# Patient Record
Sex: MALE | Race: WHITE | NOT HISPANIC OR LATINO | Employment: OTHER | URBAN - METROPOLITAN AREA
[De-identification: names, ages, dates, MRNs, and addresses within clinical notes are randomized per-mention and may not be internally consistent; named-entity substitution may affect disease eponyms.]

---

## 2017-01-07 ENCOUNTER — OFFICE VISIT (OUTPATIENT)
Dept: URGENT CARE | Facility: URGENT CARE | Age: 67
End: 2017-01-07
Payer: COMMERCIAL

## 2017-01-07 VITALS
WEIGHT: 235 LBS | OXYGEN SATURATION: 96 % | DIASTOLIC BLOOD PRESSURE: 90 MMHG | BODY MASS INDEX: 31.86 KG/M2 | TEMPERATURE: 98.5 F | HEART RATE: 64 BPM | SYSTOLIC BLOOD PRESSURE: 154 MMHG

## 2017-01-07 DIAGNOSIS — I10 ESSENTIAL HYPERTENSION, BENIGN: ICD-10-CM

## 2017-01-07 DIAGNOSIS — H66.005 RECURRENT ACUTE SUPPURATIVE OTITIS MEDIA WITHOUT SPONTANEOUS RUPTURE OF LEFT TYMPANIC MEMBRANE: Primary | ICD-10-CM

## 2017-01-07 PROCEDURE — 99214 OFFICE O/P EST MOD 30 MIN: CPT | Performed by: FAMILY MEDICINE

## 2017-01-07 NOTE — PROGRESS NOTES
SUBJECTIVE:                                                    Vikash Taylor is a 66 year old male who presents to clinic today for the following health issues:      RESPIRATORY SYMPTOMS      Duration: 3 days    Description  Left ear pain     Severity: moderate    Accompanying signs and symptoms: None    History (predisposing factors):  none    Precipitating or alleviating factors: None    Therapies tried and outcome:  none     Patient had a cold about 2 weeks ago lasted for 10 days then got better  Thought he was doing really well  3 days ago started having some left maxillary sinus pain and pressure  Then moving to his ear now having severe pain in the ear   Pain is intermittent and moderate.  Doesn't bother him chewing or moving his jaw.   No fevers or chills chest pain or shortness of breath   Because of persistent and worsening symptoms came in to be seen    Problem list and histories reviewed & adjusted, as indicated.  Additional history: as documented    Problem list, Medication list, Allergies, and Medical/Social/Surgical histories reviewed in EPIC and updated as appropriate.    ROS:  Constitutional, HEENT, cardiovascular, pulmonary, gi and gu systems are negative, except as otherwise noted.  No fevers or chills chest pain or shortness of breath  No headache no blurring of vision no numnness tingling or dizziness.  OBJECTIVE:                                                    /90 mmHg  Pulse 64  Temp(Src) 98.5  F (36.9  C) (Tympanic)  Wt 235 lb (106.595 kg)  SpO2 96%  Body mass index is 31.86 kg/(m^2).  GENERAL: healthy, alert and no distress  EYES: pink palpebral conjunctiva, anicteric sclera, pupils equally reactive to light and accomodation, extraocular muscles intact full and equal.  ENT: midline nasal septum, positive  nasal congestion   Left ear:no tragal tenderness, no mastoid tenderness dull, distorted and whitish tympaninc membrane   Right ear: no tragal tenderness, no mastoid  tenderness normal tympaninc membrane   NECK: no adenopathy, no asymmetry or  masses  RESP: lungs clear to auscultation - no rales, rhonchi or wheezes  CV: regular rate and rhythm, normal S1 S2, no S3 or S4, no murmur, click or rub, no peripheral edema and peripheral pulses strong  ABDOMEN: soft, nontender, no hepatosplenomegaly, no masses and bowel sounds normal  MS: no gross musculoskeletal defects noted, no edema  NEURO: Normal strength and tone, mentation intact and speech normal    Diagnostic Test Results:  No results found for this or any previous visit (from the past 24 hour(s)).     ASSESSMENT/PLAN:                                                      No diagnosis found.     ICD-10-CM    1. Recurrent acute suppurative otitis media without spontaneous rupture of left tympanic membrane H66.005 amoxicillin-clavulanate (AUGMENTIN) 875-125 MG per tablet     OTOLARYNGOLOGY REFERRAL   2. Essential hypertension, benign I10      Prescribed with augmentin  Concern about recurrent ear infections referred to ENT  BP not well controlled.   Recommend recheck with pharmacy  Your blood pressure reading was elevated today.  Recommend that you have it re-checked either at home or at our clinic within a week  Avoid cold medicines that have pseudoephedrin in it, or Sudafed. You may take regular or plain Robitussin or Mucinex  If you are unsure, please ask the pharmacist    If your blood pressure top number (systolic) 180 and above, or the bottom number (diastolic) 120 and above, you need to be seen immediately.  If persistently elevated top number 140 and above, or the bottom number 90 and above, please schedule an appointment to see a provider in clinic within a week.  If you have very elevated blood pressures, accompanied by headache, chest pain, numbness, weakness, slurring of speech, confusion, difficulty walking, call 911 and go to the ER      Recommend follow up with primary care provider if no relief in 3 days, sooner if  worse  Needs ear recheck with primary care provider in 2-4 weeks  Adverse reactions of medications discussed.  Over the counter medications discussed.   Aware to come back in if with worsening symptoms or if no relief despite treatment plan  Patient voiced understanding and had no further questions.     MD Kerry Thompson MD  [facility]

## 2017-01-07 NOTE — MR AVS SNAPSHOT
After Visit Summary   1/7/2017    Vikash Taylor    MRN: [id number]           Patient Information     Date Of Birth          1950        Visit Information        Provider Department      1/7/2017 9:40 AM Kerry Newell MD [facility]        Today's Diagnoses     Recurrent acute suppurative otitis media without spontaneous rupture of left tympanic membrane    -  1        Follow-ups after your visit        Additional Services     OTOLARYNGOLOGY REFERRAL       Your provider has referred you to: [facility]: [facility] [phone]   [email or web address]    Please be aware that coverage of these services is subject to the terms and limitations of your health insurance plan.  Call member services at your health plan with any benefit or coverage questions.      Please bring the following with you to your appointment:    (1) Any X-Rays, CTs or MRIs which have been performed.  Contact the facility where they were done to arrange for  prior to your scheduled appointment.   (2) List of current medications  (3) This referral request   (4) Any documents/labs given to you for this referral                  Who to contact     If you have questions or need follow up information about today's clinic visit or your schedule please contact [facility] directly at [phone].  Normal or non-critical lab and imaging results will be communicated to you by MyChart, letter or phone within 4 business days after the clinic has received the results. If you do not hear from us within 7 days, please contact the clinic through MyChart or phone. If you have a critical or abnormal lab result, we will notify you by phone as soon as possible.  Submit refill requests through [employer] or call your pharmacy and they will forward the refill request to us. Please allow 3 business days for your refill to be completed.          Additional Information About  Your Visit        restOpolishart Information     [employer] gives you secure access to your electronic health record. If you see a primary care provider, you can also send messages to your care team and make appointments. If you have questions, please call your primary care clinic.  If you do not have a primary care provider, please call [phone] and they will assist you.        Care EveryWhere ID     This is your Care EveryWhere ID. This could be used by other organizations to access your [place] medical records  [id number]        Your Vitals Were     Pulse Temperature Pulse Oximetry             64 98.5  F (36.9  C) (Tympanic) 96%          Blood Pressure from Last 3 Encounters:   01/07/17 154/90   12/01/16 152/90   11/19/16 160/80    Weight from Last 3 Encounters:   01/07/17 235 lb (106.595 kg)   12/01/16 245 lb (111.131 kg)   11/19/16 244 lb (110.678 kg)              We Performed the Following     OTOLARYNGOLOGY REFERRAL          Today's Medication Changes          These changes are accurate as of: 1/7/17 10:47 AM.  If you have any questions, ask your nurse or doctor.               Start taking these medicines.        Dose/Directions    amoxicillin-clavulanate 875-125 MG per tablet   Commonly known as:  AUGMENTIN   Used for:  Recurrent acute suppurative otitis media without spontaneous rupture of left tympanic membrane   Started by:  Kerry Newell MD        Dose:  1 tablet   Take 1 tablet by mouth 2 times daily for 10 days   Quantity:  20 tablet   Refills:  0            Where to get your medicines      These medications were sent to [facility] [address] [facility]  [address] [address], [facility] [address]     Phone:  [phone]    - amoxicillin-clavulanate 875-125 MG per tablet             Primary Care Provider Office Phone # Fax #    Joss Neumann PA-C [phone]       [facility] [address] [facility] [address]         Thank you!     Thank you for choosing [facility]  for your care. Our goal is always to provide you with excellent care. Hearing back from our patients is one way we can continue to improve our services. Please take a few minutes to complete the written survey that you may receive in the mail after your visit with us. Thank you!             Your Updated Medication List - Protect others around you: Learn how to safely use, store and throw away your medicines at www.disposemymeds.org.          This list is accurate as of: 1/7/17 10:47 AM.  Always use your most recent med list.                   Brand Name Dispense Instructions for use    amLODIPine 5 MG tablet    NORVASC    30 tablet    Take 0.5 tablets (2.5 mg) by mouth daily       amoxicillin-clavulanate 875-125 MG per tablet    AUGMENTIN    20 tablet    Take 1 tablet by mouth 2 times daily for 10 days       neomycin-polymyxin-hydrocortisone 3.5-15716-8 otic suspension    CORTISPORIN    10 mL    Place 4 drops in ear(s) 4 times daily For 10 days.

## 2017-01-11 ENCOUNTER — OFFICE VISIT (OUTPATIENT)
Dept: OTOLARYNGOLOGY | Facility: CLINIC | Age: 67
End: 2017-01-11
Payer: COMMERCIAL

## 2017-01-11 ENCOUNTER — OFFICE VISIT (OUTPATIENT)
Dept: AUDIOLOGY | Facility: CLINIC | Age: 67
End: 2017-01-11
Payer: COMMERCIAL

## 2017-01-11 VITALS — RESPIRATION RATE: 18 BRPM | WEIGHT: 235 LBS | BODY MASS INDEX: 31.83 KG/M2 | HEIGHT: 72 IN

## 2017-01-11 DIAGNOSIS — H90.3 SENSORINEURAL HEARING LOSS, BILATERAL: Primary | ICD-10-CM

## 2017-01-11 DIAGNOSIS — H93.8X2 SENSATION OF PLUGGED EAR ON LEFT SIDE: Primary | ICD-10-CM

## 2017-01-11 DIAGNOSIS — H93.13 BILATERAL TINNITUS: ICD-10-CM

## 2017-01-11 DIAGNOSIS — H92.02 OTALGIA OF LEFT EAR: ICD-10-CM

## 2017-01-11 DIAGNOSIS — G44.009 CLUSTER HEADACHE, NOT INTRACTABLE, UNSPECIFIED CHRONICITY PATTERN: ICD-10-CM

## 2017-01-11 DIAGNOSIS — J31.0 CHRONIC RHINITIS: ICD-10-CM

## 2017-01-11 DIAGNOSIS — M62.838 SPASM OF MUSCLE: ICD-10-CM

## 2017-01-11 DIAGNOSIS — H61.22 IMPACTED CERUMEN OF LEFT EAR: ICD-10-CM

## 2017-01-11 PROCEDURE — 69210 REMOVE IMPACTED EAR WAX UNI: CPT | Performed by: OTOLARYNGOLOGY

## 2017-01-11 PROCEDURE — 92557 COMPREHENSIVE HEARING TEST: CPT | Performed by: AUDIOLOGIST

## 2017-01-11 PROCEDURE — 99203 OFFICE O/P NEW LOW 30 MIN: CPT | Mod: 25 | Performed by: OTOLARYNGOLOGY

## 2017-01-11 PROCEDURE — 99207 ZZC NO CHARGE LOS: CPT | Performed by: AUDIOLOGIST

## 2017-01-11 PROCEDURE — 92567 TYMPANOMETRY: CPT | Performed by: AUDIOLOGIST

## 2017-01-11 RX ORDER — CYCLOBENZAPRINE HCL 10 MG
5-10 TABLET ORAL 3 TIMES DAILY PRN
Qty: 30 TABLET | Refills: 1 | Status: SHIPPED | OUTPATIENT
Start: 2017-01-11 | End: 2017-02-07

## 2017-01-11 RX ORDER — FLUTICASONE PROPIONATE 50 MCG
2 SPRAY, SUSPENSION (ML) NASAL DAILY
Qty: 16 G | Refills: 3 | Status: SHIPPED | OUTPATIENT
Start: 2017-01-11 | End: 2017-11-07

## 2017-01-11 RX ORDER — FEXOFENADINE HCL 180 MG/1
180 TABLET ORAL DAILY
Qty: 90 TABLET | Refills: 3 | Status: SHIPPED | OUTPATIENT
Start: 2017-01-11 | End: 2017-11-07

## 2017-01-11 ASSESSMENT — PAIN SCALES - GENERAL: PAINLEVEL: MILD PAIN (2)

## 2017-01-11 NOTE — PATIENT INSTRUCTIONS
General Scheduling Information  To schedule your CT/MRI scan, please contact [facility] at [phone]   [address] [place] [address]    To schedule your Surgery, please contact our Specialty Schedulers at [phone]    ENT Clinic Locations Clinic Hours Telephone Number     [facility]  [address] [place] [address]   Tuesday:       8:00am -- 4:00pm    Wednesday:  8:00am - 4:00pm   To schedule an appointment with   Dr. Leong,   please contact our   Specialty Scheduling Department at:     [phone]       [facility]  [address] [place] [address]   Friday:          8:00am - 4:00pm         Urgent Care Locations Clinic Hours Telephone Numbers     [facility] Eugene  [address] [place] [address]     Monday-Friday:     11:00pm - 9:00pm    Saturday-Sunday:  9:00am - 5:00pm   [phone]     [facility]ays  [address] [place] [address]     Monday-Friday:      5:00pm - 9:00pm     Saturday-Sunday:  9:00am - 5:00pm   [phone]

## 2017-01-11 NOTE — PROGRESS NOTES
Chief Complaint - sinusitis, left ear pain    History of Present Illness - Vikash Taylor is a 66 year old male who presents to me today with left ear pain.  It has been present and noticeable for approximately 5-7 days.  This was preceded by a left cheek issue. There is a history of some ear pain with sinusitis, and has a lot of sinusitis. Gets pain in back of left ear and radiates to left scalp. Pain was up to 8-9/10. Now with antibiotics, pain is improved, but still 7-8/10. Left ear hearing is diminished, feels plugged. Right ear doesn't have pain, but it feels plugged. Has tried popping ears, hurts some. No otorrhea.     Gets sinusitis 2-3 times per year. Gets cheek pain. Starts as a headache. Gets drainage. Has some allergies to animal fur. Doesn't do much with nose or sinuses.      Past Medical History -   Patient Active Problem List   Diagnosis     Hypogonadism male     Hypertriglyceridemia     Dyslipidemia     HYPERLIPIDEMIA LDL GOAL <160     Advanced directives, counseling/discussion     Shingles     Obesity     Wears hearing aid     Bilateral low back pain with left-sided sciatica     Essential hypertension, benign     Heart murmur     SOB (shortness of breath)     Bilateral edema of lower extremity       Current Medications -   Current outpatient prescriptions:      amoxicillin-clavulanate (AUGMENTIN) 875-125 MG per tablet, Take 1 tablet by mouth 2 times daily for 10 days, Disp: 20 tablet, Rfl: 0     amLODIPine (NORVASC) 5 MG tablet, Take 0.5 tablets (2.5 mg) by mouth daily, Disp: 30 tablet, Rfl: 1     neomycin-polymyxin-hydrocortisone (CORTISPORIN) 3.5-39632-2 otic suspension, Place 4 drops in ear(s) 4 times daily For 10 days., Disp: 10 mL, Rfl: 1    Allergies -   Allergies   Allergen Reactions     Nkda [No Known Drug Allergies]        Social History -   Social History     Social History     Marital Status:      Spouse Name: N/A     Number of Children: N/A     Years of Education: N/A     Social  History Main Topics     Smoking status: Former Smoker     Smokeless tobacco: Former User     Types: Chew     Quit date: 01/01/2015     Alcohol Use: 0.0 oz/week     0 Standard drinks or equivalent per week      Comment: occ     Drug Use: No     Sexual Activity:     Partners: Female     Other Topics Concern     None     Social History Narrative       Family History -   Family History   Problem Relation Age of Onset     DIABETES Mother      HEART DISEASE Father      chf     Other Cancer Brother        Review of Systems - As per HPI and PMHx, otherwise 7 system review of the head and neck negative.    Physical Exam  Resp 18  Ht 1.829 m (6')  Wt 106.595 kg (235 lb)  BMI 31.86 kg/m2  General - The patient is nontoxic, in no distress.  Alert and oriented to person and place, answers questions and cooperates with examination appropriately.   Voice and Breathing - The patient was breathing comfortably without the use of accessory muscles. There was no wheezing, stridor, or stertor.  The patients voice was clear and strong.  Ears - had left cerumen impaction and I used the otomicroscope with him supine to suction this out. Had a large skin debri in the medial canal blocking all view of the tympanic membrane. I suctioned this and removed it. The tympanic membrane on the L is intact, no middle ear effusion. No acute infection.  No fluid or purulence was seen in the external canal. The tympanic membrane on the right is intact, no middle ear effusion. No acute infection.  No fluid or purulence was seen in the external canal.   Nose - Septum to the right. Turbinates normal size. Airway patent bilaterally. No polyps, masses, or pus.   Eyes - Extraocular movements intact. Sclera were not icteric or injected.  Mouth - Examination of the oral cavity showed pink, healthy mucosa. No lesions or ulcerations noted.  The tongue was mobile and midline.  Throat - The walls of the oropharynx were smooth, symmetric, and had no lesions or  ulcerations.  The uvula was midline on elevation.    Neck - Palpation of the occipital, submental, submandibular, internal jugular chain, and supraclavicular nodes did not demonstrate any abnormal lymph nodes or masses. No parotid masses. Palpation of the thyroid was soft and smooth, with no nodules or goiter appreciated.  The trachea was mobile and midline.  Neurological - Cranial nerves 2 through 12 were grossly intact. House-Brackmann grade 1 out of 6 bilaterally.    Audiologic Studies - An audiogram and tympanogram were performed today as part of the evaluation and personally reviewed. The tympanogram shows a type A, low volume on both sides.  The audiogram showed downsloping sensorineural hearing loss. Mostly symmetric.       Assessment and Plan - Vikash Taylor is a 66 year old male who presents to me today with  Primarily left headache and pain, radiates into left temporal area. Sometimes left cheek pain. Ear exam is normal. Differential includes headache (cluster or trigeminal neuralgia), TMJ, sinusitis. I will have him eat soft diet, hot packs, NSAIDS, and flexeril for possible TMJ. If fails he needs to see neurology.     Can use flonase, nasal saline irrigations, and antihistamines daily for sinusitis prevention.     Hearing aids for sensorineural hearing loss.    Valentín Leong MD  Otolaryngology  [facility]

## 2017-01-11 NOTE — MR AVS SNAPSHOT
After Visit Summary   1/11/2017    Vikash Taylor    MRN: [id number]           Patient Information     Date Of Birth          1950        Visit Information        Provider Department      1/11/2017 11:15 AM Valentín Leong MD [facility]        Today's Diagnoses     Sensation of plugged ear on left side    -  1     Otalgia of left ear         Cluster headache, not intractable, unspecified chronicity pattern           Care Instructions    General Scheduling Information  To schedule your CT/MRI scan, please contact [facility] at [phone] [address] [place] [address]    To schedule your Surgery, please contact our Specialty Schedulers at [phone]    ENT Clinic Locations Clinic Hours Telephone Number     [facility] Negron  [address] [place] [address]   Tuesday:       8:00am -- 4:00pm    Wednesday:  8:00am - 4:00pm   To schedule an appointment with   Dr. Leong,   please contact our   Specialty Scheduling Department at:     [phone]       [place]  [address]   [place] [address]   Friday:          8:00am - 4:00pm         Urgent Care Locations Clinic Hours Telephone Numbers     [place] [facility]  [address]  [place] [address]     Monday-Friday:     11:00pm - 9:00pm    Saturday-Sunday:  9:00am - 5:00pm   [phone]     [place] [facility]  [address]   [place] [address]     Monday-Friday:      5:00pm - 9:00pm     Saturday-Sunday:  9:00am - 5:00pm   [phone]             Follow-ups after your visit        Additional Services     AUDIOLOGY ADULT REFERRAL       Your provider has referred you to: [facility]: [facility] [place] [phone]   [email or web address]    Treatment:  Evaluation & Treatment  Specialty Testing:  Audiogram w/Tymps    Please be aware that coverage of these services is subject to the terms and limitations of your health insurance plan.  Call member  services at your health plan with any benefit or coverage questions.      Please bring the following to your appointment:  >>   Any x-rays, CTs or MRIs which have been performed.  Contact the facility where they were done to arrange for  prior to your scheduled appointment.   >>   List of current medications  >>   This referral request   >>   Any documents/labs given to you for this referral            NEUROLOGY ADULT REFERRAL       Your provider has referred you to: [facility] [place] [facility] [phone]   [email or web address]    Reason for Referral: Consult    Please be aware that coverage of these services is subject to the terms and limitations of your health insurance plan.  Call member services at your health plan with any benefit or coverage questions.      Please bring the following with you to your appointment:    (1) Any X-Rays, CTs or MRIs which have been performed.  Contact the facility where they were done to arrange for  prior to your scheduled appointment.    (2) List of current medications  (3) This referral request   (4) Any documents/labs given to you for this referral                  Your next 10 appointments already scheduled     Jan 11, 2017  2:30 PM   [facility]    [address] [place] [address] [phone]              Who to contact     If you have questions or need follow up information about today's clinic visit or your schedule please contact [facility] directly at [phone].  Normal or non-critical lab and imaging results will be communicated to you by MyChart, letter or phone within 4 business days after the clinic has received the results. If you do not hear from us within 7 days, please contact the clinic through MyChart or phone. If you have a critical or abnormal lab result, we will notify you by phone as soon as  possible.  Submit refill requests through [employer] or call your pharmacy and they will forward the refill request to us. Please allow 3 business days for your refill to be completed.          Additional Information About Your Visit        [employer] Information     [employer] gives you secure access to your electronic health record. If you see a primary care provider, you can also send messages to your care team and make appointments. If you have questions, please call your primary care clinic.  If you do not have a primary care provider, please call [phone] and they will assist you.        Care EveryWhere ID     This is your Care EveryWhere ID. This could be used by other organizations to access your [place] medical records  [id number]        Your Vitals Were     Respirations Height BMI (Body Mass Index)             18 1.829 m (6') 31.86 kg/m2          Blood Pressure from Last 3 Encounters:   01/07/17 154/90   12/01/16 152/90   11/19/16 160/80    Weight from Last 3 Encounters:   01/11/17 106.595 kg (235 lb)   01/07/17 106.595 kg (235 lb)   12/01/16 111.131 kg (245 lb)              We Performed the Following     AUDIOLOGY ADULT REFERRAL     NEUROLOGY ADULT REFERRAL        Primary Care Provider Office Phone # Fax #    Joss Neumann PA-C [phone]       [facility] [address] [facility] [address]        Thank you!     Thank you for choosing [facility] [place]  for your care. Our goal is always to provide you with excellent care. Hearing back from our patients is one way we can continue to improve our services. Please take a few minutes to complete the written survey that you may receive in the mail after your visit with us. Thank you!             Your Updated Medication List - Protect others around you: Learn how to safely use, store and throw away your medicines at www.disposemymeds.org.          This list is accurate as of: 1/11/17 12:13 PM.  Always use your most recent  med list.                   Brand Name Dispense Instructions for use    amLODIPine 5 MG tablet    NORVASC    30 tablet    Take 0.5 tablets (2.5 mg) by mouth daily       amoxicillin-clavulanate 875-125 MG per tablet    AUGMENTIN    20 tablet    Take 1 tablet by mouth 2 times daily for 10 days       neomycin-polymyxin-hydrocortisone 3.5-12008-4 otic suspension    CORTISPORIN    10 mL    Place 4 drops in ear(s) 4 times daily For 10 days.

## 2017-01-11 NOTE — NURSING NOTE
Chief Complaint   Patient presents with     Otalgia     Left ear x 1 week. Has had sinus issues for a long time, thinks it causes his ear pain       Initial Resp 18  Ht 1.829 m (6')  Wt 106.595 kg (235 lb)  BMI 31.86 kg/m2 Estimated body mass index is 31.86 kg/(m^2) as calculated from the following:    Height as of this encounter: 1.829 m (6').    Weight as of this encounter: 106.595 kg (235 lb).  BP completed using cuff size: NA (Not Taken)    Alejandrina Ahn MA

## 2017-01-26 ENCOUNTER — TELEPHONE (OUTPATIENT)
Dept: FAMILY MEDICINE | Facility: CLINIC | Age: 67
End: 2017-01-26

## 2017-01-26 NOTE — TELEPHONE ENCOUNTER
Panel Management Review      Patient has the following on his problem list:     Hypertension   Last three blood pressure readings:  BP Readings from Last 3 Encounters:   01/07/17 154/90   12/01/16 152/90   11/19/16 160/80     Blood pressure: Failed    HTN Guidelines:  Age 18-59 BP range:  Less than 140/90  Age 60-85 with Diabetes:  Less than 140/90  Age 60-85 without Diabetes:  less than 150/90      Composite cancer screening  Chart review shows that this patient is due/due soon for the following None  Summary:    Patient is due/failing the following:   BP CHECK    Action needed:   Patient needs office visit for blood pressure check per last visit with Dr. Newell .    Type of outreach:    Sent [employer] message.    Questions for provider review:    None                                                                                                                                    Ghanshyam Kat CMA       Chart routed to closed .

## 2017-02-03 ENCOUNTER — TELEPHONE (OUTPATIENT)
Dept: FAMILY MEDICINE | Facility: CLINIC | Age: 67
End: 2017-02-03

## 2017-02-03 DIAGNOSIS — I10 ESSENTIAL HYPERTENSION, BENIGN: Primary | ICD-10-CM

## 2017-02-03 RX ORDER — AMLODIPINE BESYLATE 5 MG/1
2.5 TABLET ORAL DAILY
Qty: 30 TABLET | Refills: 1 | Status: CANCELLED | OUTPATIENT
Start: 2017-02-03

## 2017-02-03 NOTE — TELEPHONE ENCOUNTER
Spoke with patient, states he increased dose to 1 tablet on his own. Patient feels it is helping for his blood pressure. Instructed patient he needs to be seen to recheck his blood pressure and review his medications. Appointment 2/7 with Joss Neumann PA-C..Sonal LINN, RN, CPN

## 2017-02-07 ENCOUNTER — OFFICE VISIT (OUTPATIENT)
Dept: FAMILY MEDICINE | Facility: CLINIC | Age: 67
End: 2017-02-07
Payer: COMMERCIAL

## 2017-02-07 VITALS
OXYGEN SATURATION: 96 % | SYSTOLIC BLOOD PRESSURE: 148 MMHG | BODY MASS INDEX: 32.75 KG/M2 | HEART RATE: 77 BPM | DIASTOLIC BLOOD PRESSURE: 98 MMHG | WEIGHT: 241.5 LBS

## 2017-02-07 DIAGNOSIS — Z23 NEED FOR VACCINATION: ICD-10-CM

## 2017-02-07 DIAGNOSIS — R09.81 SINUS CONGESTION: ICD-10-CM

## 2017-02-07 DIAGNOSIS — E78.1 HYPERTRIGLYCERIDEMIA: ICD-10-CM

## 2017-02-07 DIAGNOSIS — E78.00 HIGH BLOOD CHOLESTEROL: ICD-10-CM

## 2017-02-07 DIAGNOSIS — I10 ESSENTIAL HYPERTENSION, BENIGN: Primary | ICD-10-CM

## 2017-02-07 PROCEDURE — G0009 ADMIN PNEUMOCOCCAL VACCINE: HCPCS | Performed by: PHYSICIAN ASSISTANT

## 2017-02-07 PROCEDURE — 90670 PCV13 VACCINE IM: CPT | Performed by: PHYSICIAN ASSISTANT

## 2017-02-07 PROCEDURE — 99213 OFFICE O/P EST LOW 20 MIN: CPT | Mod: 25 | Performed by: PHYSICIAN ASSISTANT

## 2017-02-07 RX ORDER — AMLODIPINE BESYLATE 10 MG/1
10 TABLET ORAL DAILY
Qty: 90 TABLET | Refills: 1 | Status: SHIPPED | OUTPATIENT
Start: 2017-02-07 | End: 2017-05-03

## 2017-02-07 RX ORDER — MONTELUKAST SODIUM 10 MG/1
10 TABLET ORAL AT BEDTIME
Qty: 90 TABLET | Refills: 3 | Status: SHIPPED | OUTPATIENT
Start: 2017-02-07 | End: 2017-11-07

## 2017-02-07 RX ORDER — ATORVASTATIN CALCIUM 20 MG/1
20 TABLET, FILM COATED ORAL DAILY
Qty: 90 TABLET | Refills: 4 | Status: SHIPPED | OUTPATIENT
Start: 2017-02-07 | End: 2017-05-03

## 2017-02-07 NOTE — PROGRESS NOTES
SUBJECTIVE:                                                    Vikash Taylor is a 66 year old male who presents to clinic today for the following health issues:    Hypertension Follow-up      Outpatient blood pressures are being checked at store.  Results are elevated - about 140/90's.    Low Salt Diet: does try for less salt - did not follow this past weekend due to superbowl    He is unsure of the dose of amlodipine that he is on.    Stopped cholesterol meds due to body aches.     Amount of exercise or physical activity: try's to stay active     Problems taking medications regularly: No    Medication side effects: none if taking 1 tab or less     Diet: low salt    Also history of sinus congestion and sister is on singulair and he wants to try RX.     Problem list and histories reviewed & adjusted, as indicated.  Additional history: as documented    Patient Active Problem List   Diagnosis     Hypogonadism male     Hypertriglyceridemia     Dyslipidemia     HYPERLIPIDEMIA LDL GOAL <160     Advanced directives, counseling/discussion     Shingles     Obesity     Wears hearing aid     Bilateral low back pain with left-sided sciatica     Essential hypertension, benign     Heart murmur     SOB (shortness of breath)     Bilateral edema of lower extremity     Past Surgical History   Procedure Laterality Date     Arthroscopy knee rt/lt       ACL     Tonsillectomy       Lithotripsy       Lithotrypsy     Surgical history of -        toe fusion     Colonoscopy  6/6/2013     Procedure: COLONOSCOPY;  COLONSCOPY-SCREENING;  Surgeon: Vikash Brown MD;  Location:  OR       Social History   Substance Use Topics     Smoking status: Former Smoker     Smokeless tobacco: Former User     Types: Chew     Quit date: 01/01/2015     Alcohol Use: 0.0 oz/week     0 Standard drinks or equivalent per week      Comment: occ     Family History   Problem Relation Age of Onset     DIABETES Mother      HEART DISEASE Father      chf      Other Cancer Brother          Current Outpatient Prescriptions   Medication Sig Dispense Refill     amLODIPine (NORVASC) 10 MG tablet Take 1 tablet (10 mg) by mouth daily 90 tablet 1     atorvastatin (LIPITOR) 20 MG tablet Take 1 tablet (20 mg) by mouth daily 90 tablet 4     montelukast (SINGULAIR) 10 MG tablet Take 1 tablet (10 mg) by mouth At Bedtime 90 tablet 3     fluticasone (FLONASE) 50 MCG/ACT spray Spray 2 sprays into both nostrils daily 16 g 3     fexofenadine (ALLEGRA) 180 MG tablet Take 1 tablet (180 mg) by mouth daily 90 tablet 3     amLODIPine (NORVASC) 5 MG tablet Take 0.5 tablets (2.5 mg) by mouth daily 30 tablet 1     [DISCONTINUED] amLODIPine (NORVASC) 10 MG tablet Take 1 tablet (10 mg) by mouth daily (Patient taking differently: Take 5 mg by mouth daily ) 90 tablet 1     [DISCONTINUED] atorvastatin (LIPITOR) 20 MG tablet Take 1 tablet (20 mg) by mouth daily 90 tablet 4     Allergies   Allergen Reactions     Nkda [No Known Drug Allergies]      BP Readings from Last 3 Encounters:   02/07/17 148/98   01/07/17 154/90   12/01/16 152/90    Wt Readings from Last 3 Encounters:   02/07/17 241 lb 8 oz (109.544 kg)   01/11/17 235 lb (106.595 kg)   01/07/17 235 lb (106.595 kg)                  Labs reviewed in EPIC  Problem list, Medication list, Allergies, and Medical/Social/Surgical histories reviewed in [facility] and updated as appropriate.    ROS:  Constitutional, HEENT, cardiovascular, pulmonary, gi and gu systems are negative, except as otherwise noted.    OBJECTIVE:                                                    /98 mmHg  Pulse 77  Wt 241 lb 8 oz (109.544 kg)  SpO2 96%  Body mass index is 32.75 kg/(m^2).  GENERAL: healthy, alert and no distress  EYES: Eyes grossly normal to inspection, PERRL and conjunctivae and sclerae normal  HENT: ear canals and TM's normal, nose and mouth without ulcers or lesions  NECK: no adenopathy, no asymmetry, masses, or scars and thyroid normal to palpation  RESP:  lungs clear to auscultation - no rales, rhonchi or wheezes  CV: regular rate and rhythm, normal S1 S2, no S3 or S4, no murmur, click or rub, no peripheral edema and peripheral pulses strong  MS: no gross musculoskeletal defects noted, no edema    Diagnostic Test Results:  none      ASSESSMENT/PLAN:                                                        ICD-10-CM    1. Essential hypertension, benign I10 amLODIPine (NORVASC) 10 MG tablet     Comprehensive metabolic panel   2. High blood cholesterol E78.00 atorvastatin (LIPITOR) 20 MG tablet     Lipid panel reflex to direct LDL   3. Hypertriglyceridemia E78.1 atorvastatin (LIPITOR) 20 MG tablet   4. Sinus congestion R09.81 montelukast (SINGULAIR) 10 MG tablet   5. Need for vaccination Z23 VACCINE ADMINISTRATION, INITIAL     PNEUMOCOCCAL CONJ VACCINE 13 VALENT IM   will try lipitor every other day or once a week to see if that doesn't cause him body aches.   Amlodipine 10mg refilled. Recheck blood pressure in 4 wks with fasting labs.   Work on Healthy diet and exercise. Getting heart rate elevated for 30 mins most days of week.  warning signs discussed. Ok to start singulair.     Joss Neumann PA-C  [facility]

## 2017-02-07 NOTE — NURSING NOTE
Chief Complaint   Patient presents with     Hypertension       Initial /97 mmHg  Pulse 77  Wt 241 lb 8 oz (109.544 kg)  SpO2 96% Estimated body mass index is 32.75 kg/(m^2) as calculated from the following:    Height as of 1/11/17: 6' (1.829 m).    Weight as of this encounter: 241 lb 8 oz (109.544 kg).  BP completed using cuff size: karey Olivarez, CMA

## 2017-02-07 NOTE — MR AVS SNAPSHOT
After Visit Summary   2/7/2017    Vikash Taylor    MRN: [id number]           Patient Information     Date Of Birth          1950        Visit Information        Provider Department      2/7/2017 8:00 AM Joss Neumann PA-C [facility]        Today's Diagnoses     Essential hypertension, benign    -  1     High blood cholesterol         Hypertriglyceridemia         Sinus congestion            Follow-ups after your visit        Future tests that were ordered for you today     Open Future Orders        Priority Expected Expires Ordered    Lipid panel reflex to direct LDL Routine  8/11/2017 2/7/2017    Comprehensive metabolic panel Routine  8/11/2017 2/7/2017            Who to contact     If you have questions or need follow up information about today's clinic visit or your schedule please contact [facility] directly at [phone].  Normal or non-critical lab and imaging results will be communicated to you by MyChart, letter or phone within 4 business days after the clinic has received the results. If you do not hear from us within 7 days, please contact the clinic through MyChart or phone. If you have a critical or abnormal lab result, we will notify you by phone as soon as possible.  Submit refill requests through [employer] or call your pharmacy and they will forward the refill request to us. Please allow 3 business days for your refill to be completed.          Additional Information About Your Visit        MyChart Information     [employer] gives you secure access to your electronic health record. If you see a primary care provider, you can also send messages to your care team and make appointments. If you have questions, please call your primary care clinic.  If you do not have a primary care provider, please call [phone] and they will assist you.        Care EveryWhere ID     This is your Care EveryWhere ID. This could be used by other organizations to  access your [place] medical records  [id number]        Your Vitals Were     Pulse Pulse Oximetry                77 96%           Blood Pressure from Last 3 Encounters:   02/07/17 150/92   01/07/17 154/90   12/01/16 152/90    Weight from Last 3 Encounters:   02/07/17 241 lb 8 oz (109.544 kg)   01/11/17 235 lb (106.595 kg)   01/07/17 235 lb (106.595 kg)                 Today's Medication Changes          These changes are accurate as of: 2/7/17  8:39 AM.  If you have any questions, ask your nurse or doctor.               Start taking these medicines.        Dose/Directions    atorvastatin 20 MG tablet   Commonly known as:  LIPITOR   Used for:  High blood cholesterol, Hypertriglyceridemia   Started by:  Joss Neumann PA-C        Dose:  20 mg   Take 1 tablet (20 mg) by mouth daily   Quantity:  90 tablet   Refills:  4       montelukast 10 MG tablet   Commonly known as:  SINGULAIR   Used for:  Sinus congestion   Started by:  Joss Neumann PA-C        Dose:  10 mg   Take 1 tablet (10 mg) by mouth At Bedtime   Quantity:  90 tablet   Refills:  3         These medicines have changed or have updated prescriptions.        Dose/Directions    * amLODIPine 5 MG tablet   Commonly known as:  NORVASC   This may have changed:  Another medication with the same name was added. Make sure you understand how and when to take each.   Used for:  Essential hypertension, benign   Changed by:  Joss Neumann PA-C        Dose:  2.5 mg   Take 0.5 tablets (2.5 mg) by mouth daily   Quantity:  30 tablet   Refills:  1       * amLODIPine 10 MG tablet   Commonly known as:  NORVASC   This may have changed:  You were already taking a medication with the same name, and this prescription was added. Make sure you understand how and when to take each.   Used for:  Essential hypertension, benign   Changed by:  Joss Neumann PA-C        Dose:  10 mg   Take 1 tablet (10 mg) by mouth daily   Quantity:  90 tablet   Refills:   1       * Notice:  This list has 2 medication(s) that are the same as other medications prescribed for you. Read the directions carefully, and ask your doctor or other care provider to review them with you.         Where to get your medicines      These medications were sent to [facility]/pharmacy #[id number] - [place] - [address]  [address] , [place] [address]     Phone:  [phone]    - amLODIPine 10 MG tablet  - atorvastatin 20 MG tablet  - montelukast 10 MG tablet             Primary Care Provider Office Phone # Fax #    Joss Neumann PA-C [phone]       [facility] [address] [address] [facility] [address]        Thank you!     Thank you for choosing [facility]  for your care. Our goal is always to provide you with excellent care. Hearing back from our patients is one way we can continue to improve our services. Please take a few minutes to complete the written survey that you may receive in the mail after your visit with us. Thank you!             Your Updated Medication List - Protect others around you: Learn how to safely use, store and throw away your medicines at www.disposemymeds.org.          This list is accurate as of: 2/7/17  8:39 AM.  Always use your most recent med list.                   Brand Name Dispense Instructions for use    * amLODIPine 5 MG tablet    NORVASC    30 tablet    Take 0.5 tablets (2.5 mg) by mouth daily       * amLODIPine 10 MG tablet    NORVASC    90 tablet    Take 1 tablet (10 mg) by mouth daily       atorvastatin 20 MG tablet    LIPITOR    90 tablet    Take 1 tablet (20 mg) by mouth daily       fexofenadine 180 MG tablet    ALLEGRA    90 tablet    Take 1 tablet (180 mg) by mouth daily       fluticasone 50 MCG/ACT spray    FLONASE    16 g    Spray 2 sprays into both nostrils daily       montelukast 10 MG tablet    SINGULAIR    90 tablet    Take 1 tablet (10 mg) by mouth At  Bedtime       * Notice:  This list has 2 medication(s) that are the same as other medications prescribed for you. Read the directions carefully, and ask your doctor or other care provider to review them with you.

## 2017-03-29 DIAGNOSIS — I10 ESSENTIAL HYPERTENSION, BENIGN: ICD-10-CM

## 2017-03-29 DIAGNOSIS — E78.00 HIGH BLOOD CHOLESTEROL: ICD-10-CM

## 2017-03-29 LAB
ALBUMIN SERPL-MCNC: 3.9 G/DL (ref 3.4–5)
ALP SERPL-CCNC: 57 U/L (ref 40–150)
ALT SERPL W P-5'-P-CCNC: 41 U/L (ref 0–70)
ANION GAP SERPL CALCULATED.3IONS-SCNC: 10 MMOL/L (ref 3–14)
AST SERPL W P-5'-P-CCNC: 27 U/L (ref 0–45)
BILIRUB SERPL-MCNC: 0.8 MG/DL (ref 0.2–1.3)
BUN SERPL-MCNC: 17 MG/DL (ref 7–30)
CALCIUM SERPL-MCNC: 9.2 MG/DL (ref 8.5–10.1)
CHLORIDE SERPL-SCNC: 106 MMOL/L (ref 94–109)
CHOLEST SERPL-MCNC: 195 MG/DL
CO2 SERPL-SCNC: 26 MMOL/L (ref 20–32)
CREAT SERPL-MCNC: 0.98 MG/DL (ref 0.66–1.25)
GFR SERPL CREATININE-BSD FRML MDRD: 75 ML/MIN/1.7M2
GLUCOSE SERPL-MCNC: 96 MG/DL (ref 70–99)
HDLC SERPL-MCNC: 63 MG/DL
LDLC SERPL CALC-MCNC: 107 MG/DL
NONHDLC SERPL-MCNC: 132 MG/DL
POTASSIUM SERPL-SCNC: 4.5 MMOL/L (ref 3.4–5.3)
PROT SERPL-MCNC: 7.1 G/DL (ref 6.8–8.8)
SODIUM SERPL-SCNC: 142 MMOL/L (ref 133–144)
TRIGL SERPL-MCNC: 126 MG/DL

## 2017-03-29 PROCEDURE — 36415 COLL VENOUS BLD VENIPUNCTURE: CPT | Performed by: PHYSICIAN ASSISTANT

## 2017-03-29 PROCEDURE — 80061 LIPID PANEL: CPT | Performed by: PHYSICIAN ASSISTANT

## 2017-03-29 PROCEDURE — 80053 COMPREHEN METABOLIC PANEL: CPT | Performed by: PHYSICIAN ASSISTANT

## 2017-03-30 NOTE — PROGRESS NOTES
Mr. Taylor,    All of your labs were normal/ stable for you.    Please contact the clinic if you have additional questions.  Thank you.    Sincerely,    Joss Neumann PA-C

## 2017-04-21 ENCOUNTER — TRANSFERRED RECORDS (OUTPATIENT)
Dept: HEALTH INFORMATION MANAGEMENT | Facility: CLINIC | Age: 67
End: 2017-04-21

## 2017-05-03 ENCOUNTER — OFFICE VISIT (OUTPATIENT)
Dept: FAMILY MEDICINE | Facility: CLINIC | Age: 67
End: 2017-05-03
Payer: COMMERCIAL

## 2017-05-03 VITALS
WEIGHT: 211 LBS | SYSTOLIC BLOOD PRESSURE: 135 MMHG | DIASTOLIC BLOOD PRESSURE: 82 MMHG | HEIGHT: 72 IN | HEART RATE: 72 BPM | OXYGEN SATURATION: 97 % | BODY MASS INDEX: 28.58 KG/M2

## 2017-05-03 DIAGNOSIS — E78.5 HYPERLIPIDEMIA LDL GOAL <160: ICD-10-CM

## 2017-05-03 DIAGNOSIS — I10 ESSENTIAL HYPERTENSION, BENIGN: Primary | ICD-10-CM

## 2017-05-03 DIAGNOSIS — I10 HYPERTENSION GOAL BP (BLOOD PRESSURE) < 150/90: ICD-10-CM

## 2017-05-03 PROCEDURE — 99213 OFFICE O/P EST LOW 20 MIN: CPT | Performed by: PHYSICIAN ASSISTANT

## 2017-05-03 RX ORDER — AMLODIPINE BESYLATE 10 MG/1
5 TABLET ORAL DAILY
Qty: 90 TABLET | Refills: 1
Start: 2017-05-03 | End: 2017-11-07

## 2017-05-03 NOTE — PROGRESS NOTES
SUBJECTIVE:                                                    Vikash Taylor is a 67 year old male who presents to clinic today for the following health issues:    Hyperlipidemia Follow-Up      Rate your low fat/cholesterol diet?: not monitoring fat    Taking statin?  No - never started Lipitor. Worked on diet and loosing weight instead     Other lipid medications/supplements?:  none     Hypertension Follow-up      Outpatient blood pressures are being checked at home.  Results are normal  - brought readings with today to review.    Low Salt Diet: not monitoring salt       Amount of exercise or physical activity: energy levels have been up so has been more active and doing more     Problems taking medications regularly: No    Medication side effects: swelling in legs     Diet: doing nutrimost - low carb, more protein and veggies       Problem list and histories reviewed & adjusted, as indicated.  Additional history: as documented    Patient Active Problem List   Diagnosis     Hypogonadism male     Hypertriglyceridemia     Dyslipidemia     HYPERLIPIDEMIA LDL GOAL <160     Advanced directives, counseling/discussion     Shingles     Obesity     Wears hearing aid     Bilateral low back pain with left-sided sciatica     Essential hypertension, benign     Heart murmur     SOB (shortness of breath)     Bilateral edema of lower extremity     Hypertension goal BP (blood pressure) < 150/90     Past Surgical History:   Procedure Laterality Date     ARTHROSCOPY KNEE RT/LT      ACL     COLONOSCOPY  6/6/2013    Procedure: COLONOSCOPY;  COLONSCOPY-SCREENING;  Surgeon: Vikash Brown MD;  Location: [facility] OR     LITHOTRIPSY      Lithotrypsy     SURGICAL HISTORY OF -       toe fusion     TONSILLECTOMY         Social History   Substance Use Topics     Smoking status: Former Smoker     Smokeless tobacco: Former User     Types: Chew     Quit date: 1/1/2015     Alcohol use 0.0 oz/week     0 Standard drinks or equivalent per week       Comment: occ     Family History   Problem Relation Age of Onset     DIABETES Mother      HEART DISEASE Father      chf     Other Cancer Brother          Current Outpatient Prescriptions   Medication Sig Dispense Refill     amLODIPine (NORVASC) 10 MG tablet Take 0.5 tablets (5 mg) by mouth daily 90 tablet 1     montelukast (SINGULAIR) 10 MG tablet Take 1 tablet (10 mg) by mouth At Bedtime 90 tablet 3     fluticasone (FLONASE) 50 MCG/ACT spray Spray 2 sprays into both nostrils daily 16 g 3     [DISCONTINUED] amLODIPine (NORVASC) 10 MG tablet Take 1 tablet (10 mg) by mouth daily (Patient not taking: Reported on 5/3/2017) 90 tablet 1     fexofenadine (ALLEGRA) 180 MG tablet Take 1 tablet (180 mg) by mouth daily (Patient not taking: Reported on 5/3/2017) 90 tablet 3     [DISCONTINUED] amLODIPine (NORVASC) 5 MG tablet Take 0.5 tablets (2.5 mg) by mouth daily 30 tablet 1     Allergies   Allergen Reactions     Nkda [No Known Drug Allergies]      BP Readings from Last 3 Encounters:   05/03/17 135/82   02/07/17 (!) 148/98   01/07/17 154/90    Wt Readings from Last 3 Encounters:   05/03/17 211 lb (95.7 kg)   02/07/17 241 lb 8 oz (109.5 kg)   01/11/17 235 lb (106.6 kg)                  Labs reviewed in EPIC    ROS:  Constitutional, HEENT, cardiovascular, pulmonary, gi and gu systems are negative, except as otherwise noted.    OBJECTIVE:                                                    /82  Pulse 72  Ht 6' (1.829 m)  Wt 211 lb (95.7 kg)  SpO2 97%  BMI 28.62 kg/m2  Body mass index is 28.62 kg/(m^2).  GENERAL: healthy, alert and no distress  EYES: Eyes grossly normal to inspection, PERRL and conjunctivae and sclerae normal  HENT: ear canals and TM's normal, nose and mouth without ulcers or lesions  NECK: no adenopathy, no asymmetry, masses, or scars and thyroid normal to palpation  RESP: lungs clear to auscultation - no rales, rhonchi or wheezes  CV: regular rate and rhythm, normal S1 S2, no S3 or S4, no murmur,  click or rub, no peripheral edema and peripheral pulses strong  ABDOMEN: soft, nontender, no hepatosplenomegaly, no masses and bowel sounds normal  MS: no gross musculoskeletal defects noted, no edema  NEURO: Normal strength and tone, mentation intact and speech normal  PSYCH: mentation appears normal, affect normal/bright    Diagnostic Test Results:  Results for orders placed or performed in visit on 03/29/17   Lipid panel reflex to direct LDL   Result Value Ref Range    Cholesterol 195 <200 mg/dL    Triglycerides 126 <150 mg/dL    HDL Cholesterol 63 >39 mg/dL    LDL Cholesterol Calculated 107 (H) <100 mg/dL    Non HDL Cholesterol 132 (H) <130 mg/dL   Comprehensive metabolic panel   Result Value Ref Range    Sodium 142 133 - 144 mmol/L    Potassium 4.5 3.4 - 5.3 mmol/L    Chloride 106 94 - 109 mmol/L    Carbon Dioxide 26 20 - 32 mmol/L    Anion Gap 10 3 - 14 mmol/L    Glucose 96 70 - 99 mg/dL    Urea Nitrogen 17 7 - 30 mg/dL    Creatinine 0.98 0.66 - 1.25 mg/dL    GFR Estimate 75 >60 mL/min/1.7m2    GFR Estimate If Black >90   GFR Calc   >60 mL/min/1.7m2    Calcium 9.2 8.5 - 10.1 mg/dL    Bilirubin Total 0.8 0.2 - 1.3 mg/dL    Albumin 3.9 3.4 - 5.0 g/dL    Protein Total 7.1 6.8 - 8.8 g/dL    Alkaline Phosphatase 57 40 - 150 U/L    ALT 41 0 - 70 U/L    AST 27 0 - 45 U/L        ASSESSMENT/PLAN:                                                        ICD-10-CM    1. Essential hypertension, benign I10 amLODIPine (NORVASC) 10 MG tablet   2. Hypertension goal BP (blood pressure) < 150/90 I10    3. Hyperlipidemia LDL goal <160 E78.5    med refilled  Work on Healthy diet and exercise. Getting heart rate elevated for 30 mins most days of week.  Discussed taking lipitor based on ASCVD risk score. He stated he would try meds, still has them at home.   Recheck blood pressure in 6 months.     The 10-year ASCVD risk score (Chaitanya HAMMER Jr, et al., 2013) is: 18.9%    Values used to calculate the score:      Age: 67  years      Sex: Male      Is Non- : No      Diabetic: No      Tobacco smoker: No      Systolic Blood Pressure: 135 mmHg      Is BP treated: Yes      HDL Cholesterol: 63 mg/dL      Total Cholesterol: 195 mg/dL   Joss Neumann PA-C  [facility]

## 2017-05-03 NOTE — NURSING NOTE
Chief Complaint   Patient presents with     Hypertension     Lipids       Initial /82  Pulse 72  Ht 6' (1.829 m)  Wt 211 lb (95.7 kg)  SpO2 97%  BMI 28.62 kg/m2 Estimated body mass index is 28.62 kg/(m^2) as calculated from the following:    Height as of this encounter: 6' (1.829 m).    Weight as of this encounter: 211 lb (95.7 kg).  Medication Reconciliation: mary Olivarez, CMA

## 2017-05-03 NOTE — MR AVS SNAPSHOT
After Visit Summary   5/3/2017    Vikash Taylor    MRN: [id number]           Patient Information     Date Of Birth          1950        Visit Information        Provider Department      5/3/2017 9:40 AM Joss Neumann PA-C [facility]        Today's Diagnoses     Essential hypertension, benign    -  1    Hypertension goal BP (blood pressure) < 150/90        Hyperlipidemia LDL goal <160           Follow-ups after your visit        Who to contact     If you have questions or need follow up information about today's clinic visit or your schedule please contact [facility] directly at [phone].  Normal or non-critical lab and imaging results will be communicated to you by [employer], letter or phone within 4 business days after the clinic has received the results. If you do not hear from us within 7 days, please contact the clinic through [employer] or phone. If you have a critical or abnormal lab result, we will notify you by phone as soon as possible.  Submit refill requests through [employer] or call your pharmacy and they will forward the refill request to us. Please allow 3 business days for your refill to be completed.          Additional Information About Your Visit        MyChart Information     [employer] gives you secure access to your electronic health record. If you see a primary care provider, you can also send messages to your care team and make appointments. If you have questions, please call your primary care clinic.  If you do not have a primary care provider, please call [phone] and they will assist you.        Care EveryWhere ID     This is your Care EveryWhere ID. This could be used by other organizations to access your [place] medical records  [id number]        Your Vitals Were     Pulse Height Pulse Oximetry BMI (Body Mass Index)          72 6' (1.829 m) 97% 28.62 kg/m2         Blood Pressure from Last 3 Encounters:   05/03/17 135/82   02/07/17  (!) 148/98   01/07/17 154/90    Weight from Last 3 Encounters:   05/03/17 211 lb (95.7 kg)   02/07/17 241 lb 8 oz (109.5 kg)   01/11/17 235 lb (106.6 kg)              Today, you had the following     No orders found for display         Today's Medication Changes          These changes are accurate as of: 5/3/17 10:11 AM.  If you have any questions, ask your nurse or doctor.               These medicines have changed or have updated prescriptions.        Dose/Directions    amLODIPine 10 MG tablet   Commonly known as:  NORVASC   This may have changed:    - how much to take  - Another medication with the same name was removed. Continue taking this medication, and follow the directions you see here.   Used for:  Essential hypertension, benign   Changed by:  Joss Neumann PA-C        Dose:  5 mg   Take 0.5 tablets (5 mg) by mouth daily   Quantity:  90 tablet   Refills:  1            Where to get your medicines      Some of these will need a paper prescription and others can be bought over the counter.  Ask your nurse if you have questions.     You don't need a prescription for these medications     amLODIPine 10 MG tablet                Primary Care Provider Office Phone # Fax #    Joss Neumann PA-C [phone]       [facility] [address] [address] [facility] [address]        Thank you!     Thank you for choosing [facility]  for your care. Our goal is always to provide you with excellent care. Hearing back from our patients is one way we can continue to improve our services. Please take a few minutes to complete the written survey that you may receive in the mail after your visit with us. Thank you!             Your Updated Medication List - Protect others around you: Learn how to safely use, store and throw away your medicines at www.disposemymeds.org.          This list is accurate as of: 5/3/17 10:11 AM.  Always use your most recent med list.                   Brand  Name Dispense Instructions for use    amLODIPine 10 MG tablet    NORVASC    90 tablet    Take 0.5 tablets (5 mg) by mouth daily       fexofenadine 180 MG tablet    ALLEGRA    90 tablet    Take 1 tablet (180 mg) by mouth daily       fluticasone 50 MCG/ACT spray    FLONASE    16 g    Spray 2 sprays into both nostrils daily       montelukast 10 MG tablet    SINGULAIR    90 tablet    Take 1 tablet (10 mg) by mouth At Bedtime

## 2017-05-26 ENCOUNTER — OFFICE VISIT (OUTPATIENT)
Dept: FAMILY MEDICINE | Facility: CLINIC | Age: 67
End: 2017-05-26
Payer: COMMERCIAL

## 2017-05-26 VITALS
TEMPERATURE: 97.3 F | SYSTOLIC BLOOD PRESSURE: 148 MMHG | BODY MASS INDEX: 28.31 KG/M2 | HEIGHT: 72 IN | WEIGHT: 209 LBS | HEART RATE: 68 BPM | OXYGEN SATURATION: 98 % | DIASTOLIC BLOOD PRESSURE: 72 MMHG

## 2017-05-26 DIAGNOSIS — H60.502 ACUTE OTITIS EXTERNA OF LEFT EAR, UNSPECIFIED TYPE: Primary | ICD-10-CM

## 2017-05-26 PROCEDURE — 99213 OFFICE O/P EST LOW 20 MIN: CPT | Performed by: PHYSICIAN ASSISTANT

## 2017-05-26 RX ORDER — NEOMYCIN SULFATE, POLYMYXIN B SULFATE AND HYDROCORTISONE 10; 3.5; 1 MG/ML; MG/ML; [USP'U]/ML
4 SUSPENSION/ DROPS AURICULAR (OTIC) 4 TIMES DAILY
Qty: 10 ML | Refills: 0 | Status: SHIPPED | OUTPATIENT
Start: 2017-05-26 | End: 2017-11-07

## 2017-05-26 NOTE — MR AVS SNAPSHOT
After Visit Summary   5/26/2017    Vikash Taylor    MRN: [id number]           Patient Information     Date Of Birth          1950        Visit Information        Provider Department      5/26/2017 9:00 AM Joss Neumann PA-C [facility]        Today's Diagnoses     Acute otitis externa of left ear, unspecified type    -  1       Follow-ups after your visit        Who to contact     If you have questions or need follow up information about today's clinic visit or your schedule please contact [facility] directly at [phone].  Normal or non-critical lab and imaging results will be communicated to you by GiveCorpshart, letter or phone within 4 business days after the clinic has received the results. If you do not hear from us within 7 days, please contact the clinic through Salt Rightst or phone. If you have a critical or abnormal lab result, we will notify you by phone as soon as possible.  Submit refill requests through vozero or call your pharmacy and they will forward the refill request to us. Please allow 3 business days for your refill to be completed.          Additional Information About Your Visit        MyChart Information     vozero gives you secure access to your electronic health record. If you see a primary care provider, you can also send messages to your care team and make appointments. If you have questions, please call your primary care clinic.  If you do not have a primary care provider, please call [phone] and they will assist you.        Care EveryWhere ID     This is your Care EveryWhere ID. This could be used by other organizations to access your [place] medical records  [id number]        Your Vitals Were     Pulse Temperature Height Pulse Oximetry BMI (Body Mass Index)       68 97.3  F (36.3  C) (Oral) 6' (1.829 m) 98% 28.35 kg/m2        Blood Pressure from Last 3 Encounters:   05/26/17 148/72   05/03/17 135/82   02/07/17 (!) 148/98     Weight from Last 3 Encounters:   05/26/17 209 lb (94.8 kg)   05/03/17 211 lb (95.7 kg)   02/07/17 241 lb 8 oz (109.5 kg)              Today, you had the following     No orders found for display         Today's Medication Changes          These changes are accurate as of: 5/26/17  9:23 AM.  If you have any questions, ask your nurse or doctor.               Start taking these medicines.        Dose/Directions    neomycin-polymyxin-hydrocortisone 3.5-20421-0 otic suspension   Commonly known as:  CORTISPORIN   Used for:  Acute otitis externa of left ear, unspecified type   Started by:  Joss Neumann PA-C        Dose:  4 drop   Place 4 drops in ear(s) 4 times daily   Quantity:  10 mL   Refills:  0            Where to get your medicines      These medications were sent to [facility] [address] [facility], [facility] [address]  [address] [address] [address]     Phone:  [phone]     neomycin-polymyxin-hydrocortisone 3.5-01192-5 otic suspension                Primary Care Provider Office Phone # Fax #    Joss Neumann PA-C [phone]       [facility] [address] [place] [facility] [address]        Thank you!     Thank you for choosing [facility]  for your care. Our goal is always to provide you with excellent care. Hearing back from our patients is one way we can continue to improve our services. Please take a few minutes to complete the written survey that you may receive in the mail after your visit with us. Thank you!             Your Updated Medication List - Protect others around you: Learn how to safely use, store and throw away your medicines at www.disposemymeds.org.          This list is accurate as of: 5/26/17  9:23 AM.  Always use your most recent med list.                   Brand Name Dispense Instructions for use    amLODIPine 10 MG tablet    NORVASC    90 tablet    Take 0.5 tablets (5 mg) by mouth daily        fexofenadine 180 MG tablet    ALLEGRA    90 tablet    Take 1 tablet (180 mg) by mouth daily       fluticasone 50 MCG/ACT spray    FLONASE    16 g    Spray 2 sprays into both nostrils daily       montelukast 10 MG tablet    SINGULAIR    90 tablet    Take 1 tablet (10 mg) by mouth At Bedtime       neomycin-polymyxin-hydrocortisone 3.5-57730-8 otic suspension    CORTISPORIN    10 mL    Place 4 drops in ear(s) 4 times daily

## 2017-05-26 NOTE — PROGRESS NOTES
SUBJECTIVE:                                                    Vikash Taylor is a 67 year old male who presents to clinic today for the following health issues:    Ear Pain      Duration: little over a week    Description  Itching, Discharge- has an odor. L ear. Pt does have hearing aids    Accompanying signs and symptoms: see above    History (predisposing factors):  none    Precipitating or alleviating factors: None    Therapies tried and outcome:  Hydrogen peroxide - took left over Amoxicillin from previous infection. helping   Uses q-tip often to clear ears. Mild pain with touching ear.     Problem list and histories reviewed & adjusted, as indicated.  Additional history: as documented    Patient Active Problem List   Diagnosis     Hypogonadism male     Hypertriglyceridemia     Dyslipidemia     HYPERLIPIDEMIA LDL GOAL <160     Advanced directives, counseling/discussion     Shingles     Obesity     Wears hearing aid     Bilateral low back pain with left-sided sciatica     Essential hypertension, benign     Heart murmur     SOB (shortness of breath)     Bilateral edema of lower extremity     Hypertension goal BP (blood pressure) < 150/90     Past Surgical History:   Procedure Laterality Date     ARTHROSCOPY KNEE RT/LT      ACL     COLONOSCOPY  6/6/2013    Procedure: COLONOSCOPY;  COLONSCOPY-SCREENING;  Surgeon: Vikash Brown MD;  Location: [facility] OR     LITHOTRIPSY      Lithotrypsy     SURGICAL HISTORY OF -       toe fusion     TONSILLECTOMY         Social History   Substance Use Topics     Smoking status: Former Smoker     Packs/day: 1.00     Years: 35.00     Types: Cigarettes, Cigars, Dip, chew, snus or snuff     Start date: 6/15/1965     Quit date: 5/1/2015     Smokeless tobacco: Former User     Quit date: 1/1/2015     Alcohol use 0.0 oz/week      Comment: occ     Family History   Problem Relation Age of Onset     DIABETES Mother      Hypertension Mother      CEREBROVASCULAR DISEASE Mother      HEART  DISEASE Father      chf     DIABETES Father      Coronary Artery Disease Father      Hypertension Father      CEREBROVASCULAR DISEASE Father      Other Cancer Brother          Current Outpatient Prescriptions   Medication Sig Dispense Refill     neomycin-polymyxin-hydrocortisone (CORTISPORIN) 3.5-92544-3 otic suspension Place 4 drops in ear(s) 4 times daily 10 mL 0     amLODIPine (NORVASC) 10 MG tablet Take 0.5 tablets (5 mg) by mouth daily 90 tablet 1     montelukast (SINGULAIR) 10 MG tablet Take 1 tablet (10 mg) by mouth At Bedtime (Patient not taking: Reported on 5/26/2017) 90 tablet 3     fluticasone (FLONASE) 50 MCG/ACT spray Spray 2 sprays into both nostrils daily (Patient not taking: Reported on 5/26/2017) 16 g 3     fexofenadine (ALLEGRA) 180 MG tablet Take 1 tablet (180 mg) by mouth daily (Patient not taking: Reported on 5/3/2017) 90 tablet 3     Allergies   Allergen Reactions     Nkda [No Known Drug Allergies]        ROS:  Constitutional, HEENT, cardiovascular, pulmonary, gi and gu systems are negative, except as otherwise noted.    OBJECTIVE:                                                    /72  Pulse 68  Temp 97.3  F (36.3  C) (Oral)  Ht 6' (1.829 m)  Wt 209 lb (94.8 kg)  SpO2 98%  BMI 28.35 kg/m2  Body mass index is 28.35 kg/(m^2).  GENERAL: healthy, alert and no distress  Head: Normocephalic, atraumatic.  Eyes: Conjunctiva clear, non icteric. PERRLA.  Ears: External ears on left with whitish discharge. marlen TM clear of infection.  Nose: Septum midline, nasal mucosa pink and moist. No discharge.  Mouth / Throat: Normal dentition.  No oral lesions. Pharynx non erythematous, tonsils without hypertrophy.  Neck: Supple, no enlarged LN, trachea midline.   RESP: lungs clear to auscultation - no rales, rhonchi or wheezes  CV: regular rate and rhythm, normal S1 S2, no S3 or S4, no murmur, click or rub, no peripheral edema     Diagnostic Test Results:  none      ASSESSMENT/PLAN:                                                         ICD-10-CM    1. Acute otitis externa of left ear, unspecified type H60.502 neomycin-polymyxin-hydrocortisone (CORTISPORIN) 3.5-89840-4 otic suspension   patient reassurance  Avoid q-tips  Recheck 1 wk as needed .  warning signs discussed.       Joss Neumann PA-C  [facility]

## 2017-06-09 ENCOUNTER — OFFICE VISIT (OUTPATIENT)
Dept: FAMILY MEDICINE | Facility: CLINIC | Age: 67
End: 2017-06-09
Payer: COMMERCIAL

## 2017-06-09 VITALS
SYSTOLIC BLOOD PRESSURE: 137 MMHG | TEMPERATURE: 98.4 F | OXYGEN SATURATION: 98 % | HEART RATE: 70 BPM | BODY MASS INDEX: 28.07 KG/M2 | DIASTOLIC BLOOD PRESSURE: 82 MMHG | WEIGHT: 207 LBS

## 2017-06-09 DIAGNOSIS — R05.9 COUGH: ICD-10-CM

## 2017-06-09 DIAGNOSIS — J32.9 OTHER SINUSITIS: Primary | ICD-10-CM

## 2017-06-09 PROCEDURE — 99213 OFFICE O/P EST LOW 20 MIN: CPT | Performed by: PHYSICIAN ASSISTANT

## 2017-06-09 RX ORDER — AMOXICILLIN 500 MG/1
1000 CAPSULE ORAL 3 TIMES DAILY
Qty: 60 CAPSULE | Refills: 0 | Status: SHIPPED | OUTPATIENT
Start: 2017-06-09 | End: 2017-11-07

## 2017-06-09 RX ORDER — BENZONATATE 200 MG/1
200 CAPSULE ORAL 3 TIMES DAILY PRN
Qty: 21 CAPSULE | Refills: 0 | Status: SHIPPED | OUTPATIENT
Start: 2017-06-09 | End: 2017-11-07

## 2017-06-09 NOTE — NURSING NOTE
Chief Complaint   Patient presents with     Cough       Initial /82  Pulse 70  Temp 98.4  F (36.9  C) (Oral)  Wt 207 lb (93.9 kg)  SpO2 98%  BMI 28.07 kg/m2 Estimated body mass index is 28.07 kg/(m^2) as calculated from the following:    Height as of 5/26/17: 6' (1.829 m).    Weight as of this encounter: 207 lb (93.9 kg).  Medication Reconciliation: complete    Mohini Guzman MA

## 2017-06-09 NOTE — PROGRESS NOTES
SUBJECTIVE:                                                    Vikash Taylor is a 67 year old male who presents to clinic today for the following health issues:    RESPIRATORY SYMPTOMS      Duration: 2 weeks    Description  Cough, plugged up head per pt, lightheaded and chest pain from coughing so hard, coughing is worse when head is draining per pt    Severity: mild- moderate    Accompanying signs and symptoms: None    History (predisposing factors):  none    Precipitating or alleviating factors: None    Therapies tried and outcome:  Sinus congestion and pain OTC generic     Problem list and histories reviewed & adjusted, as indicated.  Additional history: as documented    Patient Active Problem List   Diagnosis     Hypogonadism male     Hypertriglyceridemia     Dyslipidemia     HYPERLIPIDEMIA LDL GOAL <160     Advanced directives, counseling/discussion     Shingles     Obesity     Wears hearing aid     Bilateral low back pain with left-sided sciatica     Essential hypertension, benign     Heart murmur     SOB (shortness of breath)     Bilateral edema of lower extremity     Hypertension goal BP (blood pressure) < 150/90     Past Surgical History:   Procedure Laterality Date     ARTHROSCOPY KNEE RT/LT      ACL     COLONOSCOPY  6/6/2013    Procedure: COLONOSCOPY;  COLONSCOPY-SCREENING;  Surgeon: Vikash Brown MD;  Location: [facility]     LITHOTRIPSY      Lithotrypsy     SURGICAL HISTORY OF -       toe fusion     TONSILLECTOMY         Social History   Substance Use Topics     Smoking status: Former Smoker     Packs/day: 1.00     Years: 35.00     Types: Cigarettes, Cigars, Dip, chew, snus or snuff     Start date: 6/15/1965     Quit date: 5/1/2015     Smokeless tobacco: Former User     Quit date: 1/1/2015     Alcohol use 0.0 oz/week      Comment: occ     Family History   Problem Relation Age of Onset     DIABETES Mother      Hypertension Mother      CEREBROVASCULAR DISEASE Mother      HEART DISEASE Father       chf     DIABETES Father      Coronary Artery Disease Father      Hypertension Father      CEREBROVASCULAR DISEASE Father      Other Cancer Brother          Current Outpatient Prescriptions   Medication Sig Dispense Refill     amoxicillin (AMOXIL) 500 MG capsule Take 2 capsules (1,000 mg) by mouth 3 times daily 60 capsule 0     benzonatate (TESSALON) 200 MG capsule Take 1 capsule (200 mg) by mouth 3 times daily as needed for cough 21 capsule 0     neomycin-polymyxin-hydrocortisone (CORTISPORIN) 3.5-63267-7 otic suspension Place 4 drops in ear(s) 4 times daily 10 mL 0     amLODIPine (NORVASC) 10 MG tablet Take 0.5 tablets (5 mg) by mouth daily 90 tablet 1     montelukast (SINGULAIR) 10 MG tablet Take 1 tablet (10 mg) by mouth At Bedtime 90 tablet 3     fluticasone (FLONASE) 50 MCG/ACT spray Spray 2 sprays into both nostrils daily 16 g 3     fexofenadine (ALLEGRA) 180 MG tablet Take 1 tablet (180 mg) by mouth daily 90 tablet 3     Allergies   Allergen Reactions     Nkda [No Known Drug Allergies]      BP Readings from Last 3 Encounters:   06/09/17 137/82   05/26/17 148/72   05/03/17 135/82    Wt Readings from Last 3 Encounters:   06/09/17 207 lb (93.9 kg)   05/26/17 209 lb (94.8 kg)   05/03/17 211 lb (95.7 kg)             Labs reviewed in EPIC    ROS:  Constitutional, HEENT, cardiovascular, pulmonary, gi and gu systems are negative, except as otherwise noted.    OBJECTIVE:                                                    /82  Pulse 70  Temp 98.4  F (36.9  C) (Oral)  Wt 207 lb (93.9 kg)  SpO2 98%  BMI 28.07 kg/m2  Body mass index is 28.07 kg/(m^2).  GENERAL: healthy, alert and no distress  Head: Normocephalic, atraumatic.  Eyes: Conjunctiva clear, non icteric. PERRLA.  Ears: External ears and TMs normal BL.  Nasal congestion with posterior nasal drainage. no maxillary tenderness.   Mouth / Throat: Normal dentition.  No oral lesions. Pharynx non erythematous, tonsils without hypertrophy.  Neck: Supple, no  enlarged LN, trachea midline.   RESP: lungs clear to auscultation - no rales, rhonchi or wheezes  CV: regular rate and rhythm, normal S1 S2, no S3 or S4, no murmur, click or rub, no peripheral edema      Diagnostic Test Results:  none      ASSESSMENT/PLAN:                                                        ICD-10-CM    1. Other sinusitis J32.9 amoxicillin (AMOXIL) 500 MG capsule   2. Cough R05 benzonatate (TESSALON) 200 MG capsule   Warning signs discussed.  side effects discussed  Symptomatic treatment: such as fluids,  OTC acetaminophen and /or non-steroidal anti-inflammatory medication.  Follow up  1-2 wks as needed      Joss Neumann PA-C  [facility]

## 2017-06-09 NOTE — MR AVS SNAPSHOT
After Visit Summary   6/9/2017    Vikash Taylor    MRN: [id number]           Patient Information     Date Of Birth          1950        Visit Information        Provider Department      6/9/2017 1:30 PM Joss Neumann PA-C [facility]        Today's Diagnoses     Other sinusitis    -  1    Cough           Follow-ups after your visit        Who to contact     If you have questions or need follow up information about today's clinic visit or your schedule please contact [facility] directly at [phone].  Normal or non-critical lab and imaging results will be communicated to you by GrabInboxhart, letter or phone within 4 business days after the clinic has received the results. If you do not hear from us within 7 days, please contact the clinic through Posto7t or phone. If you have a critical or abnormal lab result, we will notify you by phone as soon as possible.  Submit refill requests through Dude Solutions or call your pharmacy and they will forward the refill request to us. Please allow 3 business days for your refill to be completed.          Additional Information About Your Visit        MyChart Information     Dude Solutions gives you secure access to your electronic health record. If you see a primary care provider, you can also send messages to your care team and make appointments. If you have questions, please call your primary care clinic.  If you do not have a primary care provider, please call [phone] and they will assist you.        Care EveryWhere ID     This is your Care EveryWhere ID. This could be used by other organizations to access your [place] medical records  [id number]        Your Vitals Were     Pulse Temperature Pulse Oximetry BMI (Body Mass Index)          70 98.4  F (36.9  C) (Oral) 98% 28.07 kg/m2         Blood Pressure from Last 3 Encounters:   06/09/17 137/82   05/26/17 148/72   05/03/17 135/82    Weight from Last 3 Encounters:   06/09/17 207  lb (93.9 kg)   05/26/17 209 lb (94.8 kg)   05/03/17 211 lb (95.7 kg)              Today, you had the following     No orders found for display         Today's Medication Changes          These changes are accurate as of: 6/9/17  1:49 PM.  If you have any questions, ask your nurse or doctor.               Start taking these medicines.        Dose/Directions    amoxicillin 500 MG capsule   Commonly known as:  AMOXIL   Used for:  Other sinusitis   Started by:  Joss Neumann PA-C        Dose:  1000 mg   Take 2 capsules (1,000 mg) by mouth 3 times daily   Quantity:  60 capsule   Refills:  0       benzonatate 200 MG capsule   Commonly known as:  TESSALON   Used for:  Cough   Started by:  Joss Neumann PA-C        Dose:  200 mg   Take 1 capsule (200 mg) by mouth 3 times daily as needed for cough   Quantity:  21 capsule   Refills:  0            Where to get your medicines      These medications were sent to [address] [address]     Phone:  [phone]     amoxicillin 500 MG capsule    benzonatate 200 MG capsule                Primary Care Provider Office Phone # Fax #    Joss Neumann PA-C [phone]       [address] [address]        Thank you!     Thank you for choosing [facility]  for your care. Our goal is always to provide you with excellent care. Hearing back from our patients is one way we can continue to improve our services. Please take a few minutes to complete the written survey that you may receive in the mail after your visit with us. Thank you!             Your Updated Medication List - Protect others around you: Learn how to safely use, store and throw away your medicines at www.disposemymeds.org.          This list is accurate as of: 6/9/17  1:49 PM.  Always use your most recent med list.                   Brand Name  Dispense Instructions for use    amLODIPine 10 MG tablet    NORVASC    90 tablet    Take 0.5 tablets (5 mg) by mouth daily       amoxicillin 500 MG capsule    AMOXIL    60 capsule    Take 2 capsules (1,000 mg) by mouth 3 times daily       benzonatate 200 MG capsule    TESSALON    21 capsule    Take 1 capsule (200 mg) by mouth 3 times daily as needed for cough       fexofenadine 180 MG tablet    ALLEGRA    90 tablet    Take 1 tablet (180 mg) by mouth daily       fluticasone 50 MCG/ACT spray    FLONASE    16 g    Spray 2 sprays into both nostrils daily       montelukast 10 MG tablet    SINGULAIR    90 tablet    Take 1 tablet (10 mg) by mouth At Bedtime       neomycin-polymyxin-hydrocortisone 3.5-04166-2 otic suspension    CORTISPORIN    10 mL    Place 4 drops in ear(s) 4 times daily

## 2017-07-21 DIAGNOSIS — I10 ESSENTIAL HYPERTENSION, BENIGN: ICD-10-CM

## 2017-07-21 RX ORDER — AMLODIPINE BESYLATE 5 MG/1
TABLET ORAL
Qty: 45 TABLET | Refills: 0 | Status: SHIPPED | OUTPATIENT
Start: 2017-07-21 | End: 2017-11-30

## 2017-08-02 ENCOUNTER — TRANSFERRED RECORDS (OUTPATIENT)
Dept: HEALTH INFORMATION MANAGEMENT | Facility: CLINIC | Age: 67
End: 2017-08-02

## 2017-08-08 ENCOUNTER — TELEPHONE (OUTPATIENT)
Dept: AUDIOLOGY | Facility: CLINIC | Age: 67
End: 2017-08-08

## 2017-08-08 DIAGNOSIS — H91.93 UNSPECIFIED HEARING LOSS, BILATERAL: Primary | ICD-10-CM

## 2017-08-08 PROCEDURE — V5267 HEARING AID SUP/ACCESS/DEV: HCPCS | Performed by: AUDIOLOGIST

## 2017-08-08 NOTE — TELEPHONE ENCOUNTER
Patient walks in with [employer] [facility]. He is in need of some waxguards for his hearing aids and [facility]. Today we provide him 2 packs of the ProWax MiniFit waxguards from [employer].     CHARGES:    Hearing aid supply $10 (2 packs of waxguards) bill to patient directly    Eleazar Santamaria [facility]  Licensed Audiologist #[id number]  8/8/2017

## 2017-10-04 ENCOUNTER — TRANSFERRED RECORDS (OUTPATIENT)
Dept: HEALTH INFORMATION MANAGEMENT | Facility: CLINIC | Age: 67
End: 2017-10-04

## 2017-11-07 ENCOUNTER — OFFICE VISIT (OUTPATIENT)
Dept: FAMILY MEDICINE | Facility: CLINIC | Age: 67
End: 2017-11-07
Payer: COMMERCIAL

## 2017-11-07 VITALS
DIASTOLIC BLOOD PRESSURE: 86 MMHG | BODY MASS INDEX: 29.39 KG/M2 | HEIGHT: 72 IN | HEART RATE: 73 BPM | SYSTOLIC BLOOD PRESSURE: 139 MMHG | WEIGHT: 217 LBS | OXYGEN SATURATION: 97 %

## 2017-11-07 DIAGNOSIS — H33.20 RETINAL DETACHMENT, UNSPECIFIED LATERALITY: ICD-10-CM

## 2017-11-07 DIAGNOSIS — Z01.818 PREOP GENERAL PHYSICAL EXAM: Primary | ICD-10-CM

## 2017-11-07 DIAGNOSIS — I10 HYPERTENSION GOAL BP (BLOOD PRESSURE) < 150/90: ICD-10-CM

## 2017-11-07 DIAGNOSIS — Z11.59 NEED FOR HEPATITIS C SCREENING TEST: ICD-10-CM

## 2017-11-07 LAB
ANION GAP SERPL CALCULATED.3IONS-SCNC: 7 MMOL/L (ref 3–14)
BUN SERPL-MCNC: 16 MG/DL (ref 7–30)
CALCIUM SERPL-MCNC: 8.6 MG/DL (ref 8.5–10.1)
CHLORIDE SERPL-SCNC: 105 MMOL/L (ref 94–109)
CO2 SERPL-SCNC: 26 MMOL/L (ref 20–32)
CREAT SERPL-MCNC: 1.04 MG/DL (ref 0.66–1.25)
GFR SERPL CREATININE-BSD FRML MDRD: 71 ML/MIN/1.7M2
GLUCOSE SERPL-MCNC: 102 MG/DL (ref 70–99)
POTASSIUM SERPL-SCNC: 4.6 MMOL/L (ref 3.4–5.3)
SODIUM SERPL-SCNC: 138 MMOL/L (ref 133–144)

## 2017-11-07 PROCEDURE — 99214 OFFICE O/P EST MOD 30 MIN: CPT | Performed by: PHYSICIAN ASSISTANT

## 2017-11-07 PROCEDURE — 80048 BASIC METABOLIC PNL TOTAL CA: CPT | Performed by: PHYSICIAN ASSISTANT

## 2017-11-07 PROCEDURE — 36415 COLL VENOUS BLD VENIPUNCTURE: CPT | Performed by: PHYSICIAN ASSISTANT

## 2017-11-07 NOTE — NURSING NOTE
Chief Complaint   Patient presents with     Pre-Op Exam       Initial /86  Pulse 73  Ht 6' (1.829 m)  Wt 217 lb (98.4 kg)  SpO2 97%  BMI 29.43 kg/m2 Estimated body mass index is 29.43 kg/(m^2) as calculated from the following:    Height as of this encounter: 6' (1.829 m).    Weight as of this encounter: 217 lb (98.4 kg).  Medication Reconciliation: mary Olivarez CMA

## 2017-11-07 NOTE — MR AVS SNAPSHOT
After Visit Summary   11/7/2017    Vikash Taylor    MRN: [id number]           Patient Information     Date Of Birth          1950        Visit Information        Provider Department      11/7/2017 9:20 AM Joss Neumann PA-C [facility]        Today's Diagnoses     Preop general physical exam    -  1    Retinal detachment, unspecified laterality        Hypertension goal BP (blood pressure) < 150/90          Care Instructions      Before Your Surgery      Call your surgeon if there is any change in your health. This includes signs of a cold or flu (such as a sore throat, runny nose, cough, rash or fever).    Do not smoke, drink alcohol or take over the counter medicine (unless your surgeon or primary care doctor tells you to) for the 24 hours before and after surgery.    If you take prescribed drugs: Follow your doctor s orders about which medicines to take and which to stop until after surgery.    Eating and drinking prior to surgery: follow the instructions from your surgeon    Take a shower or bath the night before surgery. Use the soap your surgeon gave you to gently clean your skin. If you do not have soap from your surgeon, use your regular soap. Do not shave or scrub the surgery site.  Wear clean pajamas and have clean sheets on your bed.           Follow-ups after your visit        Who to contact     If you have questions or need follow up information about today's clinic visit or your schedule please contact [facility] directly at [phone].  Normal or non-critical lab and imaging results will be communicated to you by MyChart, letter or phone within 4 business days after the clinic has received the results. If you do not hear from us within 7 days, please contact the clinic through Switchboardhart or phone. If you have a critical or abnormal lab result, we will notify you by phone as soon as possible.  Submit refill requests through [employer] or call your  pharmacy and they will forward the refill request to us. Please allow 3 business days for your refill to be completed.          Additional Information About Your Visit        MyChart Information     Skytaphart gives you secure access to your electronic health record. If you see a primary care provider, you can also send messages to your care team and make appointments. If you have questions, please call your primary care clinic.  If you do not have a primary care provider, please call [phone] and they will assist you.        Care EveryWhere ID     This is your Care EveryWhere ID. This could be used by other organizations to access your [place] medical records  [id number]        Your Vitals Were     Pulse Height Pulse Oximetry BMI (Body Mass Index)          73 6' (1.829 m) 97% 29.43 kg/m2         Blood Pressure from Last 3 Encounters:   11/07/17 139/86   06/09/17 137/82   05/26/17 148/72    Weight from Last 3 Encounters:   11/07/17 217 lb (98.4 kg)   06/09/17 207 lb (93.9 kg)   05/26/17 209 lb (94.8 kg)              We Performed the Following     Basic metabolic panel        Primary Care Provider Office Phone # Fax #    Joss Neumann PA-C [phone] [address] [facility] [address]        Equal Access to Services     EMETERIO BELTRÁN AH: Hadii aad ku hadasho Soomaali, waaxda luqadaha, qaybta kaalmada adeegyada, waxay idiin hayaan vikas roy . So [facility] [phone].    ATENCIÓN: Si habla español, tiene a abraham disposición servicios gratuitos de asistencia lingüística. Llame al [phone].    We comply with applicable federal civil rights laws and [place] laws. We do not discriminate on the basis of race, color, national origin, age, disability, sex, sexual orientation, or gender identity.            Thank you!     Thank you for choosing [facility]  for your care. Our goal is always to provide you with excellent care. Hearing back from our patients is one way we  can continue to improve our services. Please take a few minutes to complete the written survey that you may receive in the mail after your visit with us. Thank you!             Your Updated Medication List - Protect others around you: Learn how to safely use, store and throw away your medicines at www.disposemymeds.org.          This list is accurate as of: 11/7/17  9:32 AM.  Always use your most recent med list.                   Brand Name Dispense Instructions for use Diagnosis    amLODIPine 5 MG tablet    NORVASC    45 tablet    TAKE 0.5 TABLETS (2.5 MG) BY MOUTH DAILY    Essential hypertension, benign

## 2017-11-07 NOTE — PROGRESS NOTES
Mr. Pachecocindyeleno,    All of your labs were normal for you.    Please contact the clinic if you have additional questions.  Thank you.    Sincerely,    Joss Neumann PA-C

## 2017-11-07 NOTE — PROGRESS NOTES
[facility]  [address] [facility] [id number] [phone]  Dept: [phone]    PRE-OP EVALUATION:  Today's date: 2017    Vikash Taylor (: 1950) presents for pre-operative evaluation assessment as requested by Dr. Ralph Gonzalez.  He requires evaluation and anesthesia risk assessment prior to undergoing surgery/procedure for treatment of scar tissue .  Proposed procedure: removal for scar tissue from prior detached retina surgery    Date of Surgery/ Procedure: 17  Time of Surgery/ Procedure: 2:30pm  Hospital/Surgical Facility: [facility]   Fax number for surgical facility: [phone]  Primary Physician: Joss Neumann  Type of Anesthesia Anticipated: Local    Patient has a Health Care Directive or Living Will:  NO    Preop Questions 2017   1.  Do you have a history of heart attack, stroke, stent, bypass or surgery on an artery in the head, neck, heart or legs? No   2.  Do you ever have any pain or discomfort in your chest? No   3.  Do you have a history of  Heart Failure? No   4.   Are you troubled by shortness of breath when:  walking on a level surface, or up a slight hill, or at night? No   5.  Do you currently have a cold, bronchitis or other respiratory infection? No   6.  Do you have a cough, shortness of breath, or wheezing? No   7.  Do you sometimes get pains in the calves of your legs when you walk? No   8. Do you or anyone in your family have previous history of blood clots? No   9.  Do you or does anyone in your family have a serious bleeding problem such as prolonged bleeding following surgeries or cuts? No   10. Have you ever had problems with anemia or been told to take iron pills? No   11. Have you had any abnormal blood loss such as black, tarry or bloody stools? No   12. Have you ever had a blood transfusion? No   13. Have you or any of your relatives ever had problems with anesthesia? No   14. Do you have sleep apnea, excessive  snoring or daytime drowsiness? No   15. Do you have any prosthetic heart valves? No   16. Do you have prosthetic joints? No           HPI:                                                      Brief HPI related to upcoming procedure: in June had a detached retina and now scar tissue as developed.       See problem list for active medical problems.  Problems all longstanding and stable, except as noted/documented.  See ROS for pertinent symptoms related to these conditions.                                                                                                  .    MEDICAL HISTORY:                                                    Patient Active Problem List    Diagnosis Date Noted     Retinal detachment, unspecified laterality 11/07/2017     Priority: Medium     Hypertension goal BP (blood pressure) < 150/90 05/03/2017     Priority: Medium     Heart murmur 05/11/2016     Priority: Medium     SOB (shortness of breath) 05/11/2016     Priority: Medium     Bilateral edema of lower extremity 05/11/2016     Priority: Medium     Essential hypertension, benign 04/26/2016     Priority: Medium     Bilateral low back pain with left-sided sciatica 02/11/2016     Priority: Medium     Obesity 04/19/2013     Priority: Medium     Wears hearing aid 04/19/2013     Priority: Medium     Shingles      Priority: Medium     Advanced directives, counseling/discussion 09/29/2011     Priority: Medium     Advance Directive Problem List Overview:   Name Relationship Phone    Primary Health Care Agent            Alternative Health Care Agent          Patient states has Advance Directive and will bring in a copy to clinic. 9/29/2011 CARA Norton MA         HYPERLIPIDEMIA LDL GOAL <160 10/31/2010     Priority: Medium     Hypogonadism male 02/08/2010     Priority: Medium     Hypertriglyceridemia 02/08/2010     Priority: Medium     Dyslipidemia 02/08/2010     Priority: Medium      Past Medical History:   Diagnosis Date     Calculi, renal       Essential hypertension, benign 4/26/2016     Shingles      Testicular atrophy      Past Surgical History:   Procedure Laterality Date     ARTHROSCOPY KNEE RT/LT      ACL     COLONOSCOPY  6/6/2013    Procedure: COLONOSCOPY;  COLONSCOPY-SCREENING;  Surgeon: Vikash Brown MD;  Location: [facility] OR     LITHOTRIPSY      Lithotrypsy     SURGICAL HISTORY OF -       toe fusion     TONSILLECTOMY       Current Outpatient Prescriptions   Medication Sig Dispense Refill     amLODIPine (NORVASC) 5 MG tablet TAKE 0.5 TABLETS (2.5 MG) BY MOUTH DAILY 45 tablet 0     [DISCONTINUED] amLODIPine (NORVASC) 10 MG tablet Take 0.5 tablets (5 mg) by mouth daily 90 tablet 1     OTC products: None, except as noted above    Allergies   Allergen Reactions     Nkda [No Known Drug Allergies]       Latex Allergy: NO    Social History   Substance Use Topics     Smoking status: Former Smoker     Packs/day: 1.00     Years: 35.00     Types: Cigarettes, Cigars, Dip, chew, snus or snuff     Start date: 6/15/1965     Quit date: 5/1/2015     Smokeless tobacco: Former User     Quit date: 1/1/2015     Alcohol use 0.0 oz/week      Comment: occ     History   Drug Use No       REVIEW OF SYSTEMS:                                                    C: NEGATIVE for fever, chills, change in weight  I: NEGATIVE for worrisome rashes, moles or lesions  E: NEGATIVE for vision changes or irritation  E/M: NEGATIVE for ear, mouth and throat problems  R: NEGATIVE for significant cough or SOB  B: NEGATIVE for masses, tenderness or discharge  CV: NEGATIVE for chest pain, palpitations or peripheral edema  GI: NEGATIVE for nausea, abdominal pain, heartburn, or change in bowel habits  : NEGATIVE for frequency, dysuria, or hematuria  M: NEGATIVE for significant arthralgias or myalgia  N: NEGATIVE for weakness, dizziness or paresthesias  E: NEGATIVE for temperature intolerance, skin/hair changes  H: NEGATIVE for bleeding problems  P: NEGATIVE for changes in mood or  affect    EXAM:                                                    /86  Pulse 73  Ht 6' (1.829 m)  Wt 217 lb (98.4 kg)  SpO2 97%  BMI 29.43 kg/m2    GENERAL APPEARANCE: healthy, alert and no distress     EYES: EOMI,  PERRL     HENT: ear canals and TM's normal and nose and mouth without ulcers or lesions     NECK: no adenopathy, no asymmetry, masses, or scars and thyroid normal to palpation     RESP: lungs clear to auscultation - no rales, rhonchi or wheezes     CV: regular rates and rhythm, normal S1 S2, no S3 or S4 and no murmur, click or rub     ABDOMEN:  soft, nontender, no HSM or masses and bowel sounds normal     MS: extremities normal- no gross deformities noted, no evidence of inflammation in joints, FROM in all extremities.     SKIN: no suspicious lesions or rashes     NEURO: Normal strength and tone, sensory exam grossly normal, mentation intact and speech normal     PSYCH: mentation appears normal. and affect normal/bright     LYMPHATICS: No axillary, cervical, or supraclavicular nodes    DIAGNOSTICS:                                                      Labs Resulted Today:   Results for orders placed or performed in visit on 03/29/17   Lipid panel reflex to direct LDL   Result Value Ref Range    Cholesterol 195 <200 mg/dL    Triglycerides 126 <150 mg/dL    HDL Cholesterol 63 >39 mg/dL    LDL Cholesterol Calculated 107 (H) <100 mg/dL    Non HDL Cholesterol 132 (H) <130 mg/dL   Comprehensive metabolic panel   Result Value Ref Range    Sodium 142 133 - 144 mmol/L    Potassium 4.5 3.4 - 5.3 mmol/L    Chloride 106 94 - 109 mmol/L    Carbon Dioxide 26 20 - 32 mmol/L    Anion Gap 10 3 - 14 mmol/L    Glucose 96 70 - 99 mg/dL    Urea Nitrogen 17 7 - 30 mg/dL    Creatinine 0.98 0.66 - 1.25 mg/dL    GFR Estimate 75 >60 mL/min/1.7m2    GFR Estimate If Black >90   GFR Calc   >60 mL/min/1.7m2    Calcium 9.2 8.5 - 10.1 mg/dL    Bilirubin Total 0.8 0.2 - 1.3 mg/dL    Albumin 3.9 3.4 - 5.0 g/dL     Protein Total 7.1 6.8 - 8.8 g/dL    Alkaline Phosphatase 57 40 - 150 U/L    ALT 41 0 - 70 U/L    AST 27 0 - 45 U/L       Recent Labs   Lab Test  03/29/17   0924  05/09/16 1946 01/18/16   1317   HGB   --    --   15.2   PLT   --    --   152   NA  142  141  140   POTASSIUM  4.5  4.3  4.2   CR  0.98  1.15  1.09        IMPRESSION:                                                    Reason for surgery/procedure: treatment of scar tissue .  Proposed procedure: removal for scar tissue from prior detached retina surgery    The proposed surgical procedure is considered LOW risk.    REVISED CARDIAC RISK INDEX  The patient has the following serious cardiovascular risks for perioperative complications such as (MI, PE, VFib and 3  AV Block):  No serious cardiac risks  INTERPRETATION: 0 risks: Class I (very low risk - 0.4% complication rate)    The patient has the following additional risks for perioperative complications:  No identified additional risks      ICD-10-CM    1. Preop general physical exam Z01.818 Basic metabolic panel   2. Retinal detachment, unspecified laterality H33.20    3. Hypertension goal BP (blood pressure) < 150/90 I10    4. Need for hepatitis C screening test Z11.59 **Hepatitis C Screen Reflex to RNA FUTURE anytime       RECOMMENDATIONS:                                                      APPROVAL GIVEN to proceed with proposed procedure, without further diagnostic evaluation       Signed Electronically by: Joss Neumann PA-C    Copy of this evaluation report is provided to requesting physician.    [facility] Preop Guidelines  I agree with the above plan  Baljit Leigh MD

## 2017-11-30 DIAGNOSIS — I10 ESSENTIAL HYPERTENSION, BENIGN: ICD-10-CM

## 2017-11-30 RX ORDER — AMLODIPINE BESYLATE 5 MG/1
TABLET ORAL
Qty: 45 TABLET | Refills: 3 | Status: SHIPPED | OUTPATIENT
Start: 2017-11-30 | End: 2018-06-08

## 2018-05-04 ENCOUNTER — TRANSFERRED RECORDS (OUTPATIENT)
Dept: HEALTH INFORMATION MANAGEMENT | Facility: CLINIC | Age: 68
End: 2018-05-04

## 2018-05-17 ENCOUNTER — TELEPHONE (OUTPATIENT)
Dept: FAMILY MEDICINE | Facility: CLINIC | Age: 68
End: 2018-05-17

## 2018-05-17 NOTE — TELEPHONE ENCOUNTER
I called the patient and LM stating that he is due for a BP check appointment, cholesterol and a physical.  I gave him the main clinic number to call to schedule his appointment(s) and if he has further questions he can leave a message for the care team.  Deidra Casillas,

## 2018-05-17 NOTE — TELEPHONE ENCOUNTER
Patient is wondering if he is due for any type of appointments  Please call to discuss  Thank you

## 2018-05-18 ENCOUNTER — DOCUMENTATION ONLY (OUTPATIENT)
Dept: LAB | Facility: CLINIC | Age: 68
End: 2018-05-18

## 2018-05-18 DIAGNOSIS — I10 ESSENTIAL HYPERTENSION, BENIGN: ICD-10-CM

## 2018-05-18 DIAGNOSIS — E78.5 HYPERLIPIDEMIA LDL GOAL <160: ICD-10-CM

## 2018-05-18 DIAGNOSIS — E78.1 HYPERTRIGLYCERIDEMIA: Primary | ICD-10-CM

## 2018-05-18 DIAGNOSIS — E78.5 DYSLIPIDEMIA: ICD-10-CM

## 2018-05-18 DIAGNOSIS — Z11.59 NEED FOR HEPATITIS C SCREENING TEST: ICD-10-CM

## 2018-05-18 NOTE — PROGRESS NOTES
Patient has an upcoming previsit appointment on 05/23/2018. Please review pended orders and add additional orders if needed.     Thank you,   Flavia Hedrick

## 2018-05-23 DIAGNOSIS — E78.5 DYSLIPIDEMIA: ICD-10-CM

## 2018-05-23 DIAGNOSIS — E78.1 HYPERTRIGLYCERIDEMIA: ICD-10-CM

## 2018-05-23 DIAGNOSIS — Z11.59 NEED FOR HEPATITIS C SCREENING TEST: ICD-10-CM

## 2018-05-23 DIAGNOSIS — E78.5 HYPERLIPIDEMIA LDL GOAL <160: ICD-10-CM

## 2018-05-23 DIAGNOSIS — I10 ESSENTIAL HYPERTENSION, BENIGN: ICD-10-CM

## 2018-05-23 LAB
ANION GAP SERPL CALCULATED.3IONS-SCNC: 5 MMOL/L (ref 3–14)
BUN SERPL-MCNC: 17 MG/DL (ref 7–30)
CALCIUM SERPL-MCNC: 8.7 MG/DL (ref 8.5–10.1)
CHLORIDE SERPL-SCNC: 107 MMOL/L (ref 94–109)
CHOLEST SERPL-MCNC: 216 MG/DL
CO2 SERPL-SCNC: 28 MMOL/L (ref 20–32)
CREAT SERPL-MCNC: 0.99 MG/DL (ref 0.66–1.25)
GFR SERPL CREATININE-BSD FRML MDRD: 74 ML/MIN/1.7M2
GLUCOSE SERPL-MCNC: 107 MG/DL (ref 70–99)
HCV AB SERPL QL IA: NONREACTIVE
HDLC SERPL-MCNC: 45 MG/DL
LDLC SERPL CALC-MCNC: 141 MG/DL
NONHDLC SERPL-MCNC: 171 MG/DL
POTASSIUM SERPL-SCNC: 4.7 MMOL/L (ref 3.4–5.3)
SODIUM SERPL-SCNC: 140 MMOL/L (ref 133–144)
TRIGL SERPL-MCNC: 152 MG/DL

## 2018-05-23 PROCEDURE — 80048 BASIC METABOLIC PNL TOTAL CA: CPT | Performed by: PHYSICIAN ASSISTANT

## 2018-05-23 PROCEDURE — 86803 HEPATITIS C AB TEST: CPT | Performed by: PHYSICIAN ASSISTANT

## 2018-05-23 PROCEDURE — 80061 LIPID PANEL: CPT | Performed by: PHYSICIAN ASSISTANT

## 2018-05-23 PROCEDURE — 36415 COLL VENOUS BLD VENIPUNCTURE: CPT | Performed by: PHYSICIAN ASSISTANT

## 2018-06-08 ENCOUNTER — OFFICE VISIT (OUTPATIENT)
Dept: FAMILY MEDICINE | Facility: CLINIC | Age: 68
End: 2018-06-08
Payer: COMMERCIAL

## 2018-06-08 VITALS
HEART RATE: 69 BPM | TEMPERATURE: 97.1 F | WEIGHT: 220 LBS | SYSTOLIC BLOOD PRESSURE: 160 MMHG | HEIGHT: 72 IN | DIASTOLIC BLOOD PRESSURE: 98 MMHG | OXYGEN SATURATION: 97 % | BODY MASS INDEX: 29.8 KG/M2 | RESPIRATION RATE: 16 BRPM

## 2018-06-08 DIAGNOSIS — E78.00 HIGH BLOOD CHOLESTEROL: ICD-10-CM

## 2018-06-08 DIAGNOSIS — R73.01 IMPAIRED FASTING GLUCOSE: ICD-10-CM

## 2018-06-08 DIAGNOSIS — Z00.00 ROUTINE GENERAL MEDICAL EXAMINATION AT A HEALTH CARE FACILITY: Primary | ICD-10-CM

## 2018-06-08 DIAGNOSIS — I10 HYPERTENSION GOAL BP (BLOOD PRESSURE) < 150/90: ICD-10-CM
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
## 2018-06-08 DIAGNOSIS — M54.42 BILATERAL LOW BACK PAIN WITH LEFT-SIDED SCIATICA, UNSPECIFIED CHRONICITY: ICD-10-CM

## 2018-06-08 PROCEDURE — 36415 COLL VENOUS BLD VENIPUNCTURE: CPT | Performed by: PHYSICIAN ASSISTANT

## 2018-06-08 PROCEDURE — G0439 PPPS, SUBSEQ VISIT: HCPCS | Performed by: PHYSICIAN ASSISTANT

## 2018-06-08 PROCEDURE — 86617 LYME DISEASE ANTIBODY: CPT | Mod: 90 | Performed by: PHYSICIAN ASSISTANT

## 2018-06-08 PROCEDURE — 99000 SPECIMEN HANDLING OFFICE-LAB: CPT | Performed by: PHYSICIAN ASSISTANT

## 2018-06-08 PROCEDURE — 86618 LYME DISEASE ANTIBODY: CPT | Performed by: PHYSICIAN ASSISTANT

## 2018-06-08 RX ORDER — AMLODIPINE BESYLATE 5 MG/1
5 TABLET ORAL DAILY
Qty: 90 TABLET | Refills: 1 | Status: SHIPPED | OUTPATIENT
Start: 2018-06-08 | End: 2019-03-12

## 2018-06-08 RX ORDER — ATORVASTATIN CALCIUM 20 MG/1
20 TABLET, FILM COATED ORAL DAILY
Qty: 90 TABLET | Refills: 3 | Status: SHIPPED | OUTPATIENT
Start: 2018-06-08 | End: 2019-03-12

## 2018-06-08 ASSESSMENT — ACTIVITIES OF DAILY LIVING (ADL)
I_NEED_ASSISTANCE_FOR_THE_FOLLOWING_DAILY_ACTIVITIES:: NO ASSISTANCE IS NEEDED
CURRENT_FUNCTION: NO ASSISTANCE NEEDED

## 2018-06-08 ASSESSMENT — PAIN SCALES - GENERAL: PAINLEVEL: NO PAIN (0)

## 2018-06-08 NOTE — PROGRESS NOTES
"  SUBJECTIVE:   Vikash Taylor is a 68 year old male who presents for Preventive Visit.  {PVP to remind patient that this is not necessarily a physical exam; physical exam may or may not be done:546418::\"click delete button to remove this line now\"}  {PVP to inform patient that additional E&M charge may apply, if additional problems addressed:026791::\"click delete button to remove this line now\"}  Are you in the first 12 months of your Medicare Part B coverage?  {No Yes:888321::\"No\"}    Healthy Habits:    Do you get at least three servings of calcium containing foods daily (dairy, green leafy vegetables, etc.)? {YES/NO, DAIRY INTAKE:286699::\"yes\"}    Amount of exercise or daily activities, outside of work: {AMOUNT EXERCISE:498679}    Problems taking medications regularly {Yes /No default:836434::\"No\"}    Medication side effects: {Yes /No default.:469653::\"No\"}    Have you had an eye exam in the past two years? {YESNOBLANK:500093}    Do you see a dentist twice per year? {YESNOBLANK:730291}    Do you have sleep apnea, excessive snoring or daytime drowsiness?{YESNOBLANK:490373}      Ability to successfully perform activities of daily living: {YES/NO (MEDICARE):103557::\"Yes, no assistance needed\"}    Home safety:  {IPPE SAFETY CONCERNS:378383::\"none identified\"}     Hearing impairment: {NO/YES:708075}    Fall risk:  {Document Fall Risk in the Assessments Section of the Navigator:335753}    {If any of the above assessments are answered yes, consider ordering appropriate referrals (Optional):307996::\"click delete button to remove this line now\"}    {AWV Cognitive Screenin}    {Outside tests to abstract? :467206}    {additional problems to add (Optional):684862}    Reviewed and updated as needed this visit by clinical staff         Reviewed and updated as needed this visit by Provider        Social History   Substance Use Topics     Smoking status: Former Smoker     Packs/day: 1.00     Years: 35.00     Types: " Cigarettes, Cigars, Dip, chew, snus or snuff     Start date: 6/15/1965     Quit date: 2015     Smokeless tobacco: Former User     Quit date: 2015     Alcohol use 0.0 oz/week      Comment: occ       If you drink alcohol do you typically have >3 drinks per day or >7 drinks per week? {ETOH :811761}                        Today's PHQ-2 Score:   PHQ-2 (  [employer]) 2016   Q1: Little interest or pleasure in doing things 0 0   Q2: Feeling down, depressed or hopeless 0 0   PHQ-2 Score 0 0     {PHQ-2 LOOK IN ASSESSMENTS (Optional) :286768}  Do you feel safe in your environment - {YES/NO/NA:925717}    Do you have a Health Care Directive?: {HEALTHCARE DIRECTIVE STATUS:089823}    Current providers sharing in care for this patient include:   Patient Care Team:  Joss Neumann PA-C as PCP - General (Family Practice)    The following health maintenance items are reviewed in Epic and correct as of today:  Health Maintenance   Topic Date Due     ADVANCE DIRECTIVE PLANNING Q5 YRS  2016     FALL RISK ASSESSMENT  2018     BMP Q1 YR  2019     LIPID MONITORING Q1 YEAR  2019     TETANUS IMMUNIZATION (SYSTEM ASSIGNED)  10/10/2022     COLONOSCOPY Q10 YR  2023     PNEUMOCOCCAL  Completed     INFLUENZA VACCINE  Completed     AORTIC ANEURYSM SCREENING (SYSTEM ASSIGNED)  Completed     HEPATITIS C SCREENING  Completed     {Chronicprobdata (Optional):002509}    {Decision Support (Optional):043359}    ROS:  {ROS COMP:998055}    OBJECTIVE:   There were no vitals taken for this visit. Estimated body mass index is 29.43 kg/(m^2) as calculated from the following:    Height as of 17: 6' (1.829 m).    Weight as of 17: 217 lb (98.4 kg).  EXAM:   {Exam :980698}    ASSESSMENT / PLAN:   {Diag Picklist:507640}    End of Life Planning:  Patient currently has an advanced directive: { :637991}    COUNSELING:  {Medicare Counselin}    {BP Counseling- Complete if BP >= 120/80   (Optional):592988}    Estimated body mass index is 29.43 kg/(m^2) as calculated from the following:    Height as of 11/7/17: 6' (1.829 m).    Weight as of 11/7/17: 217 lb (98.4 kg).  {Weight Management Plan -- Complete if patient has an abnormal BMI (Optional):213837}     reports that he quit smoking about 3 years ago. His smoking use included Cigarettes, Cigars, and Dip, chew, snus or snuff. He started smoking about 53 years ago. He has a 35.00 pack-year smoking history. He quit smokeless tobacco use about 3 years ago.  {Tobacco Cessation -- Complete if patient is a smoker (Optional):439627}    Appropriate preventive services were discussed with this patient, including applicable screening as appropriate for cardiovascular disease, diabetes, osteopenia/osteoporosis, and glaucoma.  As appropriate for age/gender, discussed screening for colorectal cancer, prostate cancer, breast cancer, and cervical cancer. Checklist reviewing preventive services available has been given to the patient.    Reviewed patients plan of care and provided an AVS. The {CarePlan:461296} for [place] meets the Care Plan requirement. This Care Plan has been established and reviewed with the {PATIENT, FAMILY MEMBER, CAREGIVER:422907}.    Counseling Resources:  ATP IV Guidelines  Pooled Cohorts Equation Calculator  Breast Cancer Risk Calculator  FRAX Risk Assessment  ICSI Preventive Guidelines  Dietary Guidelines for Americans, 2010  USDA's MyPlate  ASA Prophylaxis  Lung CA Screening    Joss Neumann PA-C  [facility]

## 2018-06-08 NOTE — NURSING NOTE
Chief Complaint   Patient presents with     Physical     Health Maintenance     ADp, Fall Risk, PHQ2       Initial BP (!) 166/100  Pulse 69  Temp 97.1  F (36.2  C) (Oral)  Resp 16  Ht 6' (1.829 m)  Wt 220 lb (99.8 kg)  SpO2 97%  BMI 29.84 kg/m2 Estimated body mass index is 29.84 kg/(m^2) as calculated from the following:    Height as of this encounter: 6' (1.829 m).    Weight as of this encounter: 220 lb (99.8 kg).  Medication Reconciliation: complete  Leila Olivarez, CMA

## 2018-06-08 NOTE — PROGRESS NOTES
SUBJECTIVE:   CC: Vikash Taylor is an 68 year old male who presents for preventative health visit.     Physical   Annual:     Getting at least 3 servings of Calcium per day::  Yes    Bi-annual eye exam::  Yes    Dental care twice a year::  NO    Sleep apnea or symptoms of sleep apnea::  None    Diet::  Regular (no restrictions)    Taking medications regularly::  Yes    Medication side effects::  None    Additional concerns today::  No    Ability to successfully perform activities of daily living: no assistance needed  Home Safety:  Lack of grab bars in the bathroom  Hearing Impairment: difficulty understanding soft or whispered speech        Discuss recent lab results   Would like a referral to see [facility] due to sciatic pain - has seen them in the past and was helpful.     History of myalgias that he thinks increased after tick bite last summer. He just wants to make sure he doesn't have lyme dz.    Was on lipitor and stopped due to leg cramps. Hasn't tried it every other day or once a week to see if he can charissa that.     Today's PHQ-2 Score:   PHQ-2 ( 1999 Pfizer) 6/8/2018   Q1: Little interest or pleasure in doing things 0   Q2: Feeling down, depressed or hopeless 0   PHQ-2 Score 0   Q1: Little interest or pleasure in doing things Not at all   Q2: Feeling down, depressed or hopeless Not at all   PHQ-2 Score 0       Abuse: Current or Past(Physical, Sexual or Emotional)- No  Do you feel safe in your environment - Yes  Bruising on right lower arm - had blood drawn before he noticed bruising and played golf - unsure of any injury though     Social History   Substance Use Topics     Smoking status: Former Smoker     Packs/day: 1.00     Years: 35.00     Types: Cigarettes, Cigars, Dip, chew, snus or snuff     Start date: 6/15/1965     Quit date: 5/1/2015     Smokeless tobacco: Former User     Quit date: 1/1/2015     Alcohol use 0.0 oz/week      Comment: occ     Alcohol Use 6/8/2018   If you drink alcohol do you  typically have greater than 3 drinks per day OR greater than 7 drinks per week? No       Last PSA:   PSA   Date Value Ref Range Status   04/21/2016 4.14 (H) 0 - 4 ug/L Final       Reviewed orders with patient. Reviewed health maintenance and updated orders accordingly - Yes  Labs reviewed in EPIC  BP Readings from Last 3 Encounters:   06/08/18 (!) 160/98   11/07/17 139/86   06/09/17 137/82    Wt Readings from Last 3 Encounters:   06/08/18 220 lb (99.8 kg)   11/07/17 217 lb (98.4 kg)   06/09/17 207 lb (93.9 kg)                  Patient Active Problem List   Diagnosis     Hypogonadism male     Hypertriglyceridemia     Dyslipidemia     HYPERLIPIDEMIA LDL GOAL <160     Advanced directives, counseling/discussion     Shingles     Obesity     Wears hearing aid     Essential hypertension, benign     Heart murmur     SOB (shortness of breath)     Bilateral edema of lower extremity     Hypertension goal BP (blood pressure) < 150/90     Retinal detachment, unspecified laterality     Bilateral low back pain with left-sided sciatica, unspecified chronicity     Impaired fasting glucose     Past Surgical History:   Procedure Laterality Date     ARTHROSCOPY KNEE RT/LT      ACL     COLONOSCOPY  6/6/2013    Procedure: COLONOSCOPY;  COLONSCOPY-SCREENING;  Surgeon: Vikash rBown MD;  Location: [facility]     LITHOTRIPSY      Lithotrypsy     SURGICAL HISTORY OF -       toe fusion     TONSILLECTOMY         Social History   Substance Use Topics     Smoking status: Former Smoker     Packs/day: 1.00     Years: 35.00     Types: Cigarettes, Cigars, Dip, chew, snus or snuff     Start date: 6/15/1965     Quit date: 5/1/2015     Smokeless tobacco: Former User     Quit date: 1/1/2015     Alcohol use 0.0 oz/week      Comment: occ     Family History   Problem Relation Age of Onset     DIABETES Mother      Hypertension Mother      CEREBROVASCULAR DISEASE Mother      HEART DISEASE Father      chf     DIABETES Father      Coronary Artery Disease  Father      Hypertension Father      CEREBROVASCULAR DISEASE Father      Other Cancer Brother          Current Outpatient Prescriptions   Medication Sig Dispense Refill     amLODIPine (NORVASC) 5 MG tablet Take 1 tablet (5 mg) by mouth daily 90 tablet 1     atorvastatin (LIPITOR) 20 MG tablet Take 1 tablet (20 mg) by mouth daily 90 tablet 3     [DISCONTINUED] amLODIPine (NORVASC) 5 MG tablet TAKE 1/2 TABLET (2.5 MG) BY MOUTH DAILY 45 tablet 3     Allergies   Allergen Reactions     Nkda [No Known Drug Allergies]        Reviewed and updated as needed this visit by clinical staff  Tobacco  Allergies  Meds  Med Hx  Surg Hx  Fam Hx  Soc Hx        Reviewed and updated as needed this visit by Provider          CONSTITUTIONAL: NEGATIVE for fever, chills, change in weight  INTEGUMENTARY/SKIN: NEGATIVE for worrisome rashes, moles or lesions  EYES: NEGATIVE for vision changes or irritation  ENT: NEGATIVE for ear, mouth and throat problems  RESP: NEGATIVE for significant cough or SOB  CV: NEGATIVE for chest pain, palpitations or peripheral edema  GI: NEGATIVE for nausea, abdominal pain, heartburn, or change in bowel habits   male: negative for dysuria, hematuria, decreased urinary stream, erectile dysfunction, urethral discharge  MUSCULOSKELETAL: NEGATIVE for significant arthralgias or myalgia  NEURO: NEGATIVE for weakness, dizziness or paresthesias  PSYCHIATRIC: NEGATIVE for changes in mood or affect  Review of Systems  CONSTITUTIONAL: NEGATIVE for fever, chills, change in weight  INTEGUMENTARY/SKIN: NEGATIVE for worrisome rashes, moles or lesions  EYES: NEGATIVE for vision changes or irritation  ENT: NEGATIVE for ear, mouth and throat problems  RESP: NEGATIVE for significant cough or SOB  CV: NEGATIVE for chest pain, palpitations or peripheral edema  GI: NEGATIVE for nausea, abdominal pain, heartburn, or change in bowel habits   male: negative for dysuria, hematuria, decreased urinary stream, erectile dysfunction,  urethral discharge  MUSCULOSKELETAL: NEGATIVE for significant arthralgias or myalgia  NEURO: NEGATIVE for weakness, dizziness or paresthesias  PSYCHIATRIC: NEGATIVE for changes in mood or affect    OBJECTIVE:   BP (!) 160/98  Pulse 69  Temp 97.1  F (36.2  C) (Oral)  Resp 16  Ht 6' (1.829 m)  Wt 220 lb (99.8 kg)  SpO2 97%  BMI 29.84 kg/m2    Physical Exam  GENERAL: healthy, alert and no distress  EYES: Eyes grossly normal to inspection, PERRL and conjunctivae and sclerae normal  HENT: ear canals and TM's normal, nose and mouth without ulcers or lesions  NECK: no adenopathy, no asymmetry, masses, or scars and thyroid normal to palpation  RESP: lungs clear to auscultation - no rales, rhonchi or wheezes  CV: regular rate and rhythm, normal S1 S2, no S3 or S4, no murmur, click or rub, no peripheral edema and peripheral pulses strong  ABDOMEN: soft, nontender, no hepatosplenomegaly, no masses and bowel sounds normal  MS: no gross musculoskeletal defects noted, no edema  SKIN: no suspicious lesions or rashes  NEURO: Normal strength and tone, mentation intact and speech normal  PSYCH: mentation appears normal, affect normal/bright    ASSESSMENT/PLAN:       ICD-10-CM    1. Routine general medical examination at a health care facility Z00.00    2. Hypertension goal BP (blood pressure) < 150/90 I10 amLODIPine (NORVASC) 5 MG tablet   3. Bilateral low back pain with left-sided sciatica, unspecified chronicity M54.42 PHYSICAL THERAPY REFERRAL   4. Impaired fasting glucose R73.01    5. Tick bite, initial encounter W57.XXXA Lyme Disease Brianne with reflex to WB Serum   6. High blood cholesterol E78.00 atorvastatin (LIPITOR) 20 MG tablet   1. Work on Healthy diet and exercise. Getting heart rate elevated for 30 mins most days of week. Labs reviewed with Vikash Taylor  2. Increase amlodipine to 5mg, 1 full tablet daily and recheck 2 wks with pharmacy for blood pressure check. If stable then recheck 6 months.   3. Ok for  PHYSICAL THERAPY referral.   4. Work on Healthy diet and exercise. Getting heart rate elevated for 30 mins most days of week.   5. Lab pending  6. Start back on lipitor.    COUNSELING:   Reviewed preventive health counseling, as reflected in patient instructions       Regular exercise       Healthy diet/nutrition         reports that he quit smoking about 3 years ago. His smoking use included Cigarettes, Cigars, and Dip, chew, snus or snuff. He started smoking about 53 years ago. He has a 35.00 pack-year smoking history. He quit smokeless tobacco use about 3 years ago.    Estimated body mass index is 29.84 kg/(m^2) as calculated from the following:    Height as of this encounter: 6' (1.829 m).    Weight as of this encounter: 220 lb (99.8 kg).   Weight management plan: Discussed healthy diet and exercise guidelines and patient will follow up in 12 months in clinic to re-evaluate.    Counseling Resources:  ATP IV Guidelines  Pooled Cohorts Equation Calculator  FRAX Risk Assessment  ICSI Preventive Guidelines  Dietary Guidelines for Americans, 2010  USDA's MyPlate  ASA Prophylaxis  Lung CA Screening    The 10-year ASCVD risk score (Chaitanya HARMAN Jr, et al., 2013) is: 31.8%    Values used to calculate the score:      Age: 68 years      Sex: Male      Is Non- : No      Diabetic: No      Tobacco smoker: No      Systolic Blood Pressure: 160 mmHg      Is BP treated: Yes      HDL Cholesterol: 45 mg/dL      Total Cholesterol: 216 mg/dL   Joss Neumann PA-C  [facility]

## 2018-06-08 NOTE — MR AVS SNAPSHOT
After Visit Summary   6/8/2018    Vikash Taylor    MRN: [id number]           Patient Information     Date Of Birth          1950        Visit Information        Provider Department      6/8/2018 10:40 AM Joss Neumann PA-C [facility]        Today's Diagnoses     Routine general medical examination at a health care facility    -  1    Hypertension goal BP (blood pressure) < 150/90        Bilateral low back pain with left-sided sciatica, unspecified chronicity        Impaired fasting glucose        Tick bite, initial encounter        High blood cholesterol          Care Instructions      Preventive Health Recommendations:   Male Ages 65 and over    Yearly exam:             See your health care provider every year in order to  o   Review health changes.   o   Discuss preventive care.    o   Review your medicines if your doctor has prescribed any.    Talk with your health care provider about whether you should have a test to screen for prostate cancer (PSA).    Every 3 years, have a diabetes test (fasting glucose). If you are at risk for diabetes, you should have this test more often.    Every 5 years, have a cholesterol test. Have this test more often if you are at risk for high cholesterol or heart disease.     Every 10 years, have a colonoscopy. Or, have a yearly FIT test (stool test). These exams will check for colon cancer.    Talk to with your health care provider about screening for Abdominal Aortic Aneurysm if you have a family history of AAA or have a history of smoking.    Shots:     Get a flu shot each year.     Get a tetanus shot every 10 years.     Talk to your doctor about your pneumonia vaccines. There are now two you should receive - Pneumovax (PPSV 23) and Prevnar (PCV 13).     Talk to your doctor about a shingles vaccine.     Talk to your doctor about the hepatitis B vaccine.  Nutrition:     Eat at least 5 servings of fruits and vegetables each day.     Eat  "whole-grain bread, whole-wheat pasta and brown rice instead of white grains and rice.     Talk to your provider about Calcium and Vitamin D.   Lifestyle    Exercise for at least 150 minutes a week (30 minutes a day, 5 days a week). This will help you control your weight and prevent disease.     Limit alcohol to one drink per day.     No smoking.     Wear sunscreen to prevent skin cancer.     See your dentist every six months for an exam and cleaning.     See your eye doctor every 1 to 2 years to screen for conditions such as glaucoma, macular degeneration, cataracts, etc           Follow-ups after your visit        Additional Services     PHYSICAL THERAPY REFERRAL       *This therapy referral will be filtered to a centralized scheduling office at [facility] and the patient will receive a call to schedule an appointment at a [place] location most convenient for them. *     [facility] provides Physical Therapy evaluation and treatment and many specialty services across the [place] system.  If requesting a specialty program, please choose from the list below.    If you have not heard from the scheduling office within 2 business days, please call [phone] for all locations, with the exception of [place], please call [phone] and [facility], please call [phone]  Treatment: Evaluation & Treatment  Special Instructions/Modalities: back pain  Special Programs: [facility]    Please be aware that coverage of these services is subject to the terms and limitations of your health insurance plan.  Call member services at your health plan with any benefit or coverage questions.      **Note to Provider:  If you are referring outside of [place] for the therapy appointment, please list the name of the location in the \"special instructions\" above, print the referral and give to the patient to schedule the appointment.                  Who to contact     If you have questions " or need follow up information about today's clinic visit or your schedule please contact [facility] directly at [phone].  Normal or non-critical lab and imaging results will be communicated to you by nprogresshart, letter or phone within 4 business days after the clinic has received the results. If you do not hear from us within 7 days, please contact the clinic through [facility] or phone. If you have a critical or abnormal lab result, we will notify you by phone as soon as possible.  Submit refill requests through [employer] or call your pharmacy and they will forward the refill request to us. Please allow 3 business days for your refill to be completed.          Additional Information About Your Visit        nprogressharSprooki Information     [employer] gives you secure access to your electronic health record. If you see a primary care provider, you can also send messages to your care team and make appointments. If you have questions, please call your primary care clinic.  If you do not have a primary care provider, please call [phone] and they will assist you.        Care EveryWhere ID     This is your Care EveryWhere ID. This could be used by other organizations to access your [facility] medical records  [id number]        Your Vitals Were     Pulse Temperature Respirations Height Pulse Oximetry BMI (Body Mass Index)    69 97.1  F (36.2  C) (Oral) 16 6' (1.829 m) 97% 29.84 kg/m2       Blood Pressure from Last 3 Encounters:   06/08/18 (!) 160/98   11/07/17 139/86   06/09/17 137/82    Weight from Last 3 Encounters:   06/08/18 220 lb (99.8 kg)   11/07/17 217 lb (98.4 kg)   06/09/17 207 lb (93.9 kg)              We Performed the Following     Lyme Disease Brianne with reflex to WB Serum     PHYSICAL THERAPY REFERRAL          Today's Medication Changes          These changes are accurate as of 6/8/18 11:29 AM.  If you have any questions, ask your nurse or doctor.               Start taking these medicines.         Dose/Directions    atorvastatin 20 MG tablet   Commonly known as:  LIPITOR   Used for:  High blood cholesterol   Started by:  Joss Neumann PA-C        Dose:  20 mg   Take 1 tablet (20 mg) by mouth daily   Quantity:  90 tablet   Refills:  3         These medicines have changed or have updated prescriptions.        Dose/Directions    amLODIPine 5 MG tablet   Commonly known as:  NORVASC   This may have changed:  See the new instructions.   Used for:  Hypertension goal BP (blood pressure) < 150/90   Changed by:  Joss Neumann PA-C        Dose:  5 mg   Take 1 tablet (5 mg) by mouth daily   Quantity:  90 tablet   Refills:  1            Where to get your medicines      These medications were sent to [facility] [address] [address]     Phone:  [phone]     amLODIPine 5 MG tablet    atorvastatin 20 MG tablet                Primary Care Provider Office Phone # Fax #    Joss Neumann PA-C [phone] [address] [facility] [address]        Equal Access to Services     [facility] : Hadii collin ku hadasho Soomaali, waaxda luqadaha, qaybta kaalmada adeegyada, carlene roy . So [facility] [phone].    ATENCIÓN: Si habla español, tiene a abraham disposición servicios gratuitos de asistencia lingüística. Lizzy al [phone].    We comply with applicable federal civil rights laws and [place] laws. We do not discriminate on the basis of race, color, national origin, age, disability, sex, sexual orientation, or gender identity.            Thank you!     Thank you for choosing [facility]  for your care. Our goal is always to provide you with excellent care. Hearing back from our patients is one way we can continue to improve our services. Please take a few minutes to complete the written survey that you may receive in the mail after your visit with us. Thank  you!             Your Updated Medication List - Protect others around you: Learn how to safely use, store and throw away your medicines at www.disposemymeds.org.          This list is accurate as of 6/8/18 11:29 AM.  Always use your most recent med list.                   Brand Name Dispense Instructions for use Diagnosis    amLODIPine 5 MG tablet    NORVASC    90 tablet    Take 1 tablet (5 mg) by mouth daily    Hypertension goal BP (blood pressure) < 150/90       atorvastatin 20 MG tablet    LIPITOR    90 tablet    Take 1 tablet (20 mg) by mouth daily    High blood cholesterol

## 2018-06-11 LAB — B BURGDOR IGG+IGM SER QL: 4.67 (ref 0–0.89)

## 2018-06-13 ENCOUNTER — TELEPHONE (OUTPATIENT)
Dept: FAMILY MEDICINE | Facility: CLINIC | Age: 68
End: 2018-06-13

## 2018-06-13 DIAGNOSIS — A69.20 LYME DISEASE: Primary | ICD-10-CM

## 2018-06-13 LAB
B BURGDOR IGG SER QL IB: POSITIVE
B BURGDOR IGM SER QL IB: POSITIVE

## 2018-06-13 RX ORDER — DOXYCYCLINE 100 MG/1
100 CAPSULE ORAL 2 TIMES DAILY
Qty: 42 CAPSULE | Refills: 0 | Status: SHIPPED | OUTPATIENT
Start: 2018-06-13 | End: 2018-07-04

## 2018-06-13 NOTE — TELEPHONE ENCOUNTER
Spoke to patient on phone about results.  Start doxy  Follow up  As needed     Joss Neumann PA-C

## 2018-06-19 ENCOUNTER — TELEPHONE (OUTPATIENT)
Dept: FAMILY MEDICINE | Facility: CLINIC | Age: 68
End: 2018-06-19

## 2018-06-19 NOTE — TELEPHONE ENCOUNTER
Patient was in today with forms for Joss to fill out. Please fax to [phone] when the forms are completed. Thank you!

## 2018-06-19 NOTE — TELEPHONE ENCOUNTER
[place] Dept of Health form placed in : basket to be faxed please.  Info on top of form.  Demetra Vasquez RN

## 2018-06-19 NOTE — TELEPHONE ENCOUNTER
Dept. Of [facility] Requesting clinical info on symptoms, diagnosis and treatment on Lyme disease. Please fax to [phone] Attn: Bing Castro to leave a message.

## 2018-06-20 NOTE — TELEPHONE ENCOUNTER
This is a pre-op form.   He needs to be seen again to get a pre-op physical done to complete this form.    Thanks    Joss Neumann PA-C

## 2018-07-11 NOTE — PROGRESS NOTES
[facility]  [address] [facility] [id number] [phone]  Dept: [phone]    PRE-OP EVALUATION:  Today's date: 2018    Vikash Taylor (: 1950) presents for pre-operative evaluation assessment as requested by Dr. Lucero Case.  He requires evaluation and anesthesia risk assessment prior to undergoing surgery/procedure for treatment of right eye - cataract .    Fax number for surgical facility: [phone]  Primary Physician: Joss Neumann  Type of Anesthesia Anticipated: Local      Preop Questions 2018   Who is doing your surgery? lucero case   What are you having done? cataract right eye   Date of Surgery/Procedure:    Facility or Hospital where procedure/surgery will be performed: [facility]   1.  Do you have a history of Heart attack, stroke, stent, coronary bypass surgery, or other heart surgery? No   2.  Do you ever have any pain or discomfort in your chest? No   3.  Do you have a history of  Heart Failure? No   4.   Are you troubled by shortness of breath when:  walking on a level surface, or up a slight hill, or at night? No   5.  Do you currently have a cold, bronchitis or other respiratory infection? No   6.  Do you have a cough, shortness of breath, or wheezing? No   7.  Do you sometimes get pains in the calves of your legs when you walk? No   8. Do you or anyone in your family have previous history of blood clots? No   9.  Do you or does anyone in your family have a serious bleeding problem such as prolonged bleeding following surgeries or cuts? No   10. Have you ever had problems with anemia or been told to take iron pills? No   11. Have you had any abnormal blood loss such as black, tarry or bloody stools? No   12. Have you ever had a blood transfusion? No   13. Have you or any of your relatives ever had problems with anesthesia? No   14. Do you have sleep apnea, excessive snoring or daytime drowsiness? No   15. Do you have any  prosthetic heart valves? No   16. Do you have prosthetic joints? No         HPI:     HPI related to upcoming procedure: cataract      See problem list for active medical problems.  Problems all longstanding and stable, except as noted/documented.  See ROS for pertinent symptoms related to these conditions.                                                                                                                                                          .    MEDICAL HISTORY:     Patient Active Problem List    Diagnosis Date Noted     Cataract of right eye, unspecified cataract type 07/12/2018     Priority: Medium     Lyme disease 07/12/2018     Priority: Medium     Advanced directives, counseling/discussion 06/08/2018     Priority: Medium     Advance Directive Problem List Overview:   Name Relationship Phone    Primary Health Care Agent            Alternative Health Care Agent          Patient states has Advance Directive and will bring in a copy to clinic. 9/29/2011 CARA Norton MA         Bilateral low back pain with left-sided sciatica, unspecified chronicity 06/08/2018     Priority: Medium     Impaired fasting glucose 06/08/2018     Priority: Medium     Retinal detachment, unspecified laterality 11/07/2017     Priority: Medium     Hypertension goal BP (blood pressure) < 150/90 05/03/2017     Priority: Medium     Heart murmur 05/11/2016     Priority: Medium     SOB (shortness of breath) 05/11/2016     Priority: Medium     Bilateral edema of lower extremity 05/11/2016     Priority: Medium     Essential hypertension, benign 04/26/2016     Priority: Medium     Obesity 04/19/2013     Priority: Medium     Wears hearing aid 04/19/2013     Priority: Medium     Shingles      Priority: Medium     HYPERLIPIDEMIA LDL GOAL <160 10/31/2010     Priority: Medium     Hypogonadism male 02/08/2010     Priority: Medium     Hypertriglyceridemia 02/08/2010     Priority: Medium     Dyslipidemia 02/08/2010     Priority: Medium       Past Medical History:   Diagnosis Date     Calculi, renal      Essential hypertension, benign 4/26/2016     Shingles      Testicular atrophy      Past Surgical History:   Procedure Laterality Date     ARTHROSCOPY KNEE RT/LT      ACL     COLONOSCOPY  6/6/2013    Procedure: COLONOSCOPY;  COLONSCOPY-SCREENING;  Surgeon: Vikash Brown MD;  Location: [facility]     LITHOTRIPSY      Lithotrypsy     SURGICAL HISTORY OF -       toe fusion     TONSILLECTOMY       Current Outpatient Prescriptions   Medication Sig Dispense Refill     amLODIPine (NORVASC) 5 MG tablet Take 1 tablet (5 mg) by mouth daily 90 tablet 1     atorvastatin (LIPITOR) 20 MG tablet Take 1 tablet (20 mg) by mouth daily (Patient not taking: Reported on 7/12/2018) 90 tablet 3     OTC products: None, except as noted above    Allergies   Allergen Reactions     Nkda [No Known Drug Allergies]       Latex Allergy: NO    Social History   Substance Use Topics     Smoking status: Former Smoker     Packs/day: 1.00     Years: 35.00     Types: Cigarettes, Cigars, Dip, chew, snus or snuff     Start date: 6/15/1965     Quit date: 5/1/2015     Smokeless tobacco: Former User     Quit date: 1/1/2015     Alcohol use 0.0 oz/week      Comment: occ     History   Drug Use No       REVIEW OF SYSTEMS:   CONSTITUTIONAL: NEGATIVE for fever, chills, change in weight  INTEGUMENTARY/SKIN: NEGATIVE for worrisome rashes, moles or lesions  EYES: NEGATIVE for vision changes or irritation  ENT/MOUTH: NEGATIVE for ear, mouth and throat problems  RESP: NEGATIVE for significant cough or SOB  CV: NEGATIVE for chest pain, palpitations or peripheral edema  GI: NEGATIVE for nausea, abdominal pain, heartburn, or change in bowel habits  : NEGATIVE for frequency, dysuria, or hematuria  MUSCULOSKELETAL: NEGATIVE for significant arthralgias or myalgia  NEURO: NEGATIVE for weakness, dizziness or paresthesias  ENDOCRINE: NEGATIVE for temperature intolerance, skin/hair changes  HEME: NEGATIVE for  bleeding problems  PSYCHIATRIC: NEGATIVE for changes in mood or affect    EXAM:   /88  Pulse 65  Temp 98.1  F (36.7  C) (Oral)  Resp 16  Ht 6' (1.829 m)  Wt 222 lb (100.7 kg)  SpO2 98%  BMI 30.11 kg/m2    GENERAL APPEARANCE: healthy, alert and no distress     EYES: EOMI,  PERRL     HENT: ear canals and TM's normal and nose and mouth without ulcers or lesions     NECK: no adenopathy, no asymmetry, masses, or scars and thyroid normal to palpation     RESP: lungs clear to auscultation - no rales, rhonchi or wheezes     CV: regular rates and rhythm, normal S1 S2, no S3 or S4 and no murmur, click or rub     ABDOMEN:  soft, nontender, no HSM or masses and bowel sounds normal     MS: extremities normal- no gross deformities noted, no evidence of inflammation in joints, FROM in all extremities.     SKIN: no suspicious lesions or rashes     NEURO: Normal strength and tone, sensory exam grossly normal, mentation intact and speech normal     PSYCH: mentation appears normal. and affect normal/bright     LYMPHATICS: No cervical adenopathy    DIAGNOSTICS:     Labs Drawn and in Process:   Unresulted Labs Ordered in the Past 30 Days of this Admission     Date and Time Order Name Status Description    7/12/2018 0944 BASIC METABOLIC PANEL In process       neg echo in 2016.     Recent Labs   Lab Test  05/23/18   0845  11/07/17   0938   01/18/16   1317   HGB   --    --    --   15.2   PLT   --    --    --   152   NA  140  138   < >  140   POTASSIUM  4.7  4.6   < >  4.2   CR  0.99  1.04   < >  1.09    < > = values in this interval not displayed.        IMPRESSION:   Reason for surgery/procedure: right eye cataract.    The proposed surgical procedure is considered LOW risk.    REVISED CARDIAC RISK INDEX  The patient has the following serious cardiovascular risks for perioperative complications such as (MI, PE, VFib and 3  AV Block):  No serious cardiac risks  INTERPRETATION: 0 risks: Class I (very low risk - 0.4% complication  rate)    The patient has the following additional risks for perioperative complications:  No identified additional risks      ICD-10-CM    1. Preop general physical exam Z01.818 Basic metabolic panel   2. Cataract of right eye, unspecified cataract type H26.9    3. Hypertension goal BP (blood pressure) < 150/90 I10 Basic metabolic panel       RECOMMENDATIONS:           APPROVAL GIVEN to proceed with proposed procedure, without further diagnostic evaluation       Signed Electronically by: Joss Neumann PA-C  Chart reviewed, agree with evaluation and recommendations above.  Rhiannon Reeder M.D.       Copy of this evaluation report is provided to requesting physician.    Niki Preop Guidelines    Revised Cardiac Risk Index

## 2018-07-12 ENCOUNTER — OFFICE VISIT (OUTPATIENT)
Dept: FAMILY MEDICINE | Facility: CLINIC | Age: 68
End: 2018-07-12
Payer: COMMERCIAL

## 2018-07-12 VITALS
HEART RATE: 65 BPM | RESPIRATION RATE: 16 BRPM | WEIGHT: 222 LBS | SYSTOLIC BLOOD PRESSURE: 138 MMHG | BODY MASS INDEX: 30.07 KG/M2 | HEIGHT: 72 IN | DIASTOLIC BLOOD PRESSURE: 88 MMHG | TEMPERATURE: 98.1 F | OXYGEN SATURATION: 98 %

## 2018-07-12 DIAGNOSIS — I10 HYPERTENSION GOAL BP (BLOOD PRESSURE) < 150/90: ICD-10-CM

## 2018-07-12 DIAGNOSIS — Z01.818 PREOP GENERAL PHYSICAL EXAM: Primary | ICD-10-CM

## 2018-07-12 DIAGNOSIS — H26.9 CATARACT OF RIGHT EYE, UNSPECIFIED CATARACT TYPE: ICD-10-CM

## 2018-07-12 PROBLEM — A69.20 LYME DISEASE: Status: ACTIVE | Noted: 2018-07-12

## 2018-07-12 LAB
ANION GAP SERPL CALCULATED.3IONS-SCNC: 6 MMOL/L (ref 3–14)
BUN SERPL-MCNC: 20 MG/DL (ref 7–30)
CALCIUM SERPL-MCNC: 9.2 MG/DL (ref 8.5–10.1)
CHLORIDE SERPL-SCNC: 107 MMOL/L (ref 94–109)
CO2 SERPL-SCNC: 28 MMOL/L (ref 20–32)
CREAT SERPL-MCNC: 1.12 MG/DL (ref 0.66–1.25)
GFR SERPL CREATININE-BSD FRML MDRD: 65 ML/MIN/1.7M2
GLUCOSE SERPL-MCNC: 102 MG/DL (ref 70–99)
POTASSIUM SERPL-SCNC: 5 MMOL/L (ref 3.4–5.3)
SODIUM SERPL-SCNC: 141 MMOL/L (ref 133–144)

## 2018-07-12 PROCEDURE — 36415 COLL VENOUS BLD VENIPUNCTURE: CPT | Performed by: PHYSICIAN ASSISTANT

## 2018-07-12 PROCEDURE — 99214 OFFICE O/P EST MOD 30 MIN: CPT | Performed by: PHYSICIAN ASSISTANT

## 2018-07-12 PROCEDURE — 80048 BASIC METABOLIC PNL TOTAL CA: CPT | Performed by: PHYSICIAN ASSISTANT

## 2018-07-12 NOTE — MR AVS SNAPSHOT
After Visit Summary   7/12/2018    Vikash Taylor    MRN: [id number]           Patient Information     Date Of Birth          1950        Visit Information        Provider Department      7/12/2018 9:20 AM Joss Neumann PA-C [facility]        Today's Diagnoses     Preop general physical exam    -  1    Cataract of right eye, unspecified cataract type        Hypertension goal BP (blood pressure) < 150/90          Care Instructions      Before Your Surgery      Call your surgeon if there is any change in your health. This includes signs of a cold or flu (such as a sore throat, runny nose, cough, rash or fever).    Do not smoke, drink alcohol or take over the counter medicine (unless your surgeon or primary care doctor tells you to) for the 24 hours before and after surgery.    If you take prescribed drugs: Follow your doctor s orders about which medicines to take and which to stop until after surgery.    Eating and drinking prior to surgery: follow the instructions from your surgeon    Take a shower or bath the night before surgery. Use the soap your surgeon gave you to gently clean your skin. If you do not have soap from your surgeon, use your regular soap. Do not shave or scrub the surgery site.  Wear clean pajamas and have clean sheets on your bed.           Follow-ups after your visit        Who to contact     If you have questions or need follow up information about today's clinic visit or your schedule please contact [facility] directly at [phone].  Normal or non-critical lab and imaging results will be communicated to you by MyChart, letter or phone within 4 business days after the clinic has received the results. If you do not hear from us within 7 days, please contact the clinic through [employer] or phone. If you have a critical or abnormal lab result, we will notify you by phone as soon as possible.  Submit refill requests through [employer]edo or call  your pharmacy and they will forward the refill request to us. Please allow 3 business days for your refill to be completed.          Additional Information About Your Visit        MyChart Information     [employer] gives you secure access to your electronic health record. If you see a primary care provider, you can also send messages to your care team and make appointments. If you have questions, please call your primary care clinic.  If you do not have a primary care provider, please call [phone] and they will assist you.        Care EveryWhere ID     This is your Care EveryWhere ID. This could be used by other organizations to access your [place] medical records  [id number]        Your Vitals Were     Pulse Temperature Respirations Height Pulse Oximetry BMI (Body Mass Index)    65 98.1  F (36.7  C) (Oral) 16 6' (1.829 m) 98% 30.11 kg/m2       Blood Pressure from Last 3 Encounters:   07/12/18 138/88   06/08/18 (!) 160/98   11/07/17 139/86    Weight from Last 3 Encounters:   07/12/18 222 lb (100.7 kg)   06/08/18 220 lb (99.8 kg)   11/07/17 217 lb (98.4 kg)              We Performed the Following     Basic metabolic panel        Primary Care Provider Office Phone # Fax #    Joss Neumann PA-C [phone] [address] [facility] [address]        Equal Access to Services     EMETERIO BELTRÁN : Hadii collin ku hadasho Soenedina, waaxda luqadaha, qaybta kaalmada vikasyada, carlene roy . So [facility] [phone].    ATENCIÓN: Si habla español, tiene a abraham disposición servicios gratuitos de asistencia lingüística. Lizzy al [phone].    We comply with applicable federal civil rights laws and [place] laws. We do not discriminate on the basis of race, color, national origin, age, disability, sex, sexual orientation, or gender identity.            Thank you!     Thank you for choosing [facility]  for your care. Our goal is always to provide you with  excellent care. Hearing back from our patients is one way we can continue to improve our services. Please take a few minutes to complete the written survey that you may receive in the mail after your visit with us. Thank you!             Your Updated Medication List - Protect others around you: Learn how to safely use, store and throw away your medicines at www.disposemymeds.org.          This list is accurate as of 7/12/18  9:52 AM.  Always use your most recent med list.                   Brand Name Dispense Instructions for use Diagnosis    amLODIPine 5 MG tablet    NORVASC    90 tablet    Take 1 tablet (5 mg) by mouth daily    Hypertension goal BP (blood pressure) < 150/90       atorvastatin 20 MG tablet    LIPITOR    90 tablet    Take 1 tablet (20 mg) by mouth daily    High blood cholesterol

## 2018-07-12 NOTE — NURSING NOTE
Chief Complaint   Patient presents with     Pre-Op Exam       Initial BP (!) 155/92  Pulse 65  Temp 98.1  F (36.7  C) (Oral)  Resp 16  Ht 6' (1.829 m)  Wt 222 lb (100.7 kg)  SpO2 98%  BMI 30.11 kg/m2 Estimated body mass index is 30.11 kg/(m^2) as calculated from the following:    Height as of this encounter: 6' (1.829 m).    Weight as of this encounter: 222 lb (100.7 kg).  Medication Reconciliation: complete  Leila Olivarez, CMA

## 2018-08-30 ENCOUNTER — TRANSFERRED RECORDS (OUTPATIENT)
Dept: HEALTH INFORMATION MANAGEMENT | Facility: CLINIC | Age: 68
End: 2018-08-30

## 2018-09-25 NOTE — PROGRESS NOTES
SUBJECTIVE:   Vikash Taylor is a 68 year old male who presents to clinic today for the following health issues:      Growth on neck      Duration: few years     Description (location/character/radiation): side of neck    Intensity:  mild    Accompanying signs and symptoms: none    History (similar episodes/previous evaluation): None    Precipitating or alleviating factors: None    Therapies tried and outcome: None   History of basal cell carcinoma on forehead couple years ago. Hasn't seen derm since.       Problem list and histories reviewed & adjusted, as indicated.  Additional history: as documented    Patient Active Problem List   Diagnosis     Hypogonadism male     Hypertriglyceridemia     Dyslipidemia     HYPERLIPIDEMIA LDL GOAL <160     Advanced directives, counseling/discussion     Shingles     Obesity     Wears hearing aid     Essential hypertension, benign     Heart murmur     SOB (shortness of breath)     Bilateral edema of lower extremity     Hypertension goal BP (blood pressure) < 150/90     Retinal detachment, unspecified laterality     Bilateral low back pain with left-sided sciatica, unspecified chronicity     Impaired fasting glucose     Cataract of right eye, unspecified cataract type     Lyme disease     Past Surgical History:   Procedure Laterality Date     ARTHROSCOPY KNEE RT/LT      ACL     COLONOSCOPY  6/6/2013    Procedure: COLONOSCOPY;  COLONSCOPY-SCREENING;  Surgeon: Vikash Brown MD;  Location: [facility]     LITHOTRIPSY      Lithotrypsy     SURGICAL HISTORY OF -       toe fusion     TONSILLECTOMY         Social History   Substance Use Topics     Smoking status: Former Smoker     Packs/day: 1.00     Years: 35.00     Types: Cigarettes, Cigars, Dip, chew, snus or snuff     Start date: 6/15/1965     Quit date: 5/1/2015     Smokeless tobacco: Former User     Quit date: 1/1/2015     Alcohol use 0.0 oz/week      Comment: occ     Family History   Problem Relation Age of Onset      Diabetes Mother      Hypertension Mother      Cerebrovascular Disease Mother      HEART DISEASE Father      chf     Diabetes Father      Coronary Artery Disease Father      Hypertension Father      Cerebrovascular Disease Father      Other Cancer Brother          Current Outpatient Prescriptions   Medication Sig Dispense Refill     amLODIPine (NORVASC) 5 MG tablet Take 1 tablet (5 mg) by mouth daily 90 tablet 1     atorvastatin (LIPITOR) 20 MG tablet Take 1 tablet (20 mg) by mouth daily 90 tablet 3     Allergies   Allergen Reactions     Nkda [No Known Drug Allergies]        Reviewed and updated as needed this visit by clinical staff  Tobacco  Allergies  Meds  Med Hx  Surg Hx  Fam Hx  Soc Hx      Reviewed and updated as needed this visit by Provider           OBJECTIVE:     /84  Pulse 70  Temp 98.9  F (37.2  C) (Oral)  Ht 6' (1.829 m)  Wt 224 lb (101.6 kg)  SpO2 96%  BMI 30.38 kg/m2  Body mass index is 30.38 kg/(m^2).  GENERAL: healthy, alert and no distress  SKIN: 1cm raised warty lesion left upper trap/ cervical region. No signs of infection    Diagnostic Test Results:  none     ASSESSMENT/PLAN:         ICD-10-CM    1. Skin lesion L98.9 DERMATOLOGY REFERRAL   2. History of basal cell carcinoma Z85.828 DERMATOLOGY REFERRAL   3. Need for prophylactic vaccination and inoculation against influenza Z23 FLU VACCINE, INCREASED ANTIGEN, PRESV FREE, AGE 65+ [54455]     Vaccine Administration, Initial [61037]   possible basal cell. Follow up  With Derm.   If increase before seeing derm then come back for recheck.    Joss Neumann PA-C  [facility]

## 2018-09-26 ENCOUNTER — OFFICE VISIT (OUTPATIENT)
Dept: FAMILY MEDICINE | Facility: CLINIC | Age: 68
End: 2018-09-26
Payer: COMMERCIAL

## 2018-09-26 VITALS
SYSTOLIC BLOOD PRESSURE: 140 MMHG | DIASTOLIC BLOOD PRESSURE: 84 MMHG | BODY MASS INDEX: 30.34 KG/M2 | TEMPERATURE: 98.9 F | HEART RATE: 70 BPM | HEIGHT: 72 IN | OXYGEN SATURATION: 96 % | WEIGHT: 224 LBS

## 2018-09-26 DIAGNOSIS — Z23 NEED FOR PROPHYLACTIC VACCINATION AND INOCULATION AGAINST INFLUENZA: ICD-10-CM

## 2018-09-26 DIAGNOSIS — Z85.828 HISTORY OF BASAL CELL CARCINOMA: ICD-10-CM

## 2018-09-26 DIAGNOSIS — L98.9 SKIN LESION: Primary | ICD-10-CM

## 2018-09-26 PROCEDURE — G0008 ADMIN INFLUENZA VIRUS VAC: HCPCS | Performed by: PHYSICIAN ASSISTANT

## 2018-09-26 PROCEDURE — 90662 IIV NO PRSV INCREASED AG IM: CPT | Performed by: PHYSICIAN ASSISTANT

## 2018-09-26 PROCEDURE — 99213 OFFICE O/P EST LOW 20 MIN: CPT | Mod: 25 | Performed by: PHYSICIAN ASSISTANT

## 2018-09-26 NOTE — MR AVS SNAPSHOT
After Visit Summary   9/26/2018    Vikash Taylor    MRN: [id number]           Patient Information     Date Of Birth          1950        Visit Information        Provider Department      9/26/2018 8:40 AM Joss Neumann PA-C [facility]        Today's Diagnoses     Skin lesion    -  1    History of basal cell carcinoma           Follow-ups after your visit        Additional Services     DERMATOLOGY REFERRAL       Your provider has referred you to: FMG: [facility] [phone]   [email or web address]  UMP: [facility] [place] [phone]   [email or web address]  FHN: [facility] [place] [phone]   [email or web address]  [facility] [phone]   [email or web address]    Please be aware that coverage of these services is subject to the terms and limitations of your health insurance plan.  Call member services at your health plan with any benefit or coverage questions.      Please bring the following with you to your appointment:    (1) Any X-Rays, CTs or MRIs which have been performed.  Contact the facility where they were done to arrange for  prior to your scheduled appointment.    (2) List of current medications  (3) This referral request   (4) Any documents/labs given to you for this referral                  Who to contact     If you have questions or need follow up information about today's clinic visit or your schedule please contact [facility] directly at [phone].  Normal or non-critical lab and imaging results will be communicated to you by MyChart, letter or phone within 4 business days after the clinic has received the results. If you do not hear from us within 7 days, please contact the clinic through MyChart or phone. If you have a  critical or abnormal lab result, we will notify you by phone as soon as possible.  Submit refill requests through [employer] or call your pharmacy and they will forward the refill request to us. Please allow 3 business days for your refill to be completed.          Additional Information About Your Visit        [employer] Information     [employer] gives you secure access to your electronic health record. If you see a primary care provider, you can also send messages to your care team and make appointments. If you have questions, please call your primary care clinic.  If you do not have a primary care provider, please call [phone] and they will assist you.        Care EveryWhere ID     This is your Care EveryWhere ID. This could be used by other organizations to access your [place] medical records  [id number]        Your Vitals Were     Pulse Temperature Height Pulse Oximetry BMI (Body Mass Index)       70 98.9  F (37.2  C) (Oral) 6' (1.829 m) 96% 30.38 kg/m2        Blood Pressure from Last 3 Encounters:   09/26/18 140/84   07/12/18 138/88   06/08/18 (!) 160/98    Weight from Last 3 Encounters:   09/26/18 224 lb (101.6 kg)   07/12/18 222 lb (100.7 kg)   06/08/18 220 lb (99.8 kg)              We Performed the Following     DERMATOLOGY REFERRAL        Primary Care Provider Office Phone # Fax #    Joss Neumann PA-C [phone] [address] [facility] [address]        Equal Access to Services     EMETERIO BELTRÁN : Hadii aad ku hadasho Soomaali, waaxda luqadaha, qaybta kaalmada adeegyada, carlene roy . So [facility] [phone].    ATENCIÓN: Si habla español, tiene a abraham disposición servicios gratuitos de asistencia lingüística. Llame al [phone].    We comply with applicable federal civil rights laws and [place] laws. We do not discriminate on the basis of race, color, national origin, age, disability, sex, sexual orientation, or gender identity.            Thank  you!     Thank you for choosing [facility]  for your care. Our goal is always to provide you with excellent care. Hearing back from our patients is one way we can continue to improve our services. Please take a few minutes to complete the written survey that you may receive in the mail after your visit with us. Thank you!             Your Updated Medication List - Protect others around you: Learn how to safely use, store and throw away your medicines at www.disposemymeds.org.          This list is accurate as of 9/26/18  9:00 AM.  Always use your most recent med list.                   Brand Name Dispense Instructions for use Diagnosis    amLODIPine 5 MG tablet    NORVASC    90 tablet    Take 1 tablet (5 mg) by mouth daily    Hypertension goal BP (blood pressure) < 150/90       atorvastatin 20 MG tablet    LIPITOR    90 tablet    Take 1 tablet (20 mg) by mouth daily    High blood cholesterol

## 2018-09-26 NOTE — PROGRESS NOTES

## 2018-11-07 ENCOUNTER — TELEPHONE (OUTPATIENT)
Dept: FAMILY MEDICINE | Facility: CLINIC | Age: 68
End: 2018-11-07

## 2018-11-07 NOTE — PROGRESS NOTES
SUBJECTIVE:                                                    Vikash Taylor is a 68 year old male seen today who  has a past medical history of Calculi, renal; Essential hypertension, benign (4/26/2016); Heart murmur (5/11/2016); Hypertension goal BP (blood pressure) < 150/90 (5/3/2017); Hypogonadism male (2/8/2010); Lyme disease (7/12/2018); Shingles; and Testicular atrophy. He also has no past medical history of Arthritis; Asthma; Cancer (H); Cerebral infarction (H); Congestive heart failure (H); Congestive heart failure, unspecified; COPD (chronic obstructive pulmonary disease) (H); Coronary artery disease; Depressive disorder; Diabetes (H); Diabetes mellitus (H); Heart disease; History of blood transfusion; Malignant neoplasm (H); Thyroid disease; Uncomplicated asthma; or Unspecified cerebral artery occlusion with cerebral infarction.   who presents to clinic today for the following health issues:     Fatigue      Duration: few weeks- [place] in the last couple weeks.     Description (location/character/radiation): aching joints, fatigue, no energy    Intensity:  moderate    Accompanying signs and symptoms: wife noticed he has been more crabby over the last 2 weeks     History (similar episodes/previous evaluation): had lymes 6/18 feels symptoms are smilar    Precipitating or alleviating factors: pt only takes norvasc occasionally as he gets similar sx's    Therapies tried and outcome: None   No nausea, vomiting, diarrhea  No chest pain or short of breath. No recent illnesses.     Problem list and histories reviewed & adjusted, as indicated.  Additional history: as documented    Patient Active Problem List   Diagnosis     Hypertriglyceridemia     Dyslipidemia     HYPERLIPIDEMIA LDL GOAL <160     Advanced directives, counseling/discussion     Shingles     Obesity     Wears hearing aid     Essential hypertension, benign     Heart murmur     SOB (shortness of breath)     Bilateral edema of lower  extremity     Hypertension goal BP (blood pressure) < 150/90     Retinal detachment, unspecified laterality     Bilateral low back pain with left-sided sciatica, unspecified chronicity     Impaired fasting glucose     Cataract of right eye, unspecified cataract type     Lyme disease     Past Surgical History:   Procedure Laterality Date     ARTHROSCOPY KNEE RT/LT      ACL     COLONOSCOPY  6/6/2013    Procedure: COLONOSCOPY;  COLONSCOPY-SCREENING;  Surgeon: Vikash Brown MD;  Location: [facility]     LITHOTRIPSY      Lithotrypsy     SURGICAL HISTORY OF -       toe fusion     TONSILLECTOMY         Social History   Substance Use Topics     Smoking status: Former Smoker     Packs/day: 1.00     Years: 35.00     Types: Cigarettes, Cigars, Dip, chew, snus or snuff     Start date: 6/15/1965     Quit date: 5/1/2015     Smokeless tobacco: Former User     Quit date: 1/1/2015     Alcohol use 0.0 oz/week      Comment: occ     Family History   Problem Relation Age of Onset     Diabetes Mother      Hypertension Mother      Cerebrovascular Disease Mother      HEART DISEASE Father      chf     Diabetes Father      Coronary Artery Disease Father      Hypertension Father      Cerebrovascular Disease Father      Other Cancer Brother          Current Outpatient Prescriptions   Medication Sig Dispense Refill     amLODIPine (NORVASC) 5 MG tablet Take 1 tablet (5 mg) by mouth daily 90 tablet 1     atorvastatin (LIPITOR) 20 MG tablet Take 1 tablet (20 mg) by mouth daily 90 tablet 3     doxycycline (VIBRAMYCIN) 100 MG capsule Take 1 capsule (100 mg) by mouth 2 times daily for 21 days 42 capsule 0     Allergies   Allergen Reactions     Nkda [No Known Drug Allergies]      BP Readings from Last 3 Encounters:   11/08/18 137/88   09/26/18 140/84   07/12/18 138/88    Wt Readings from Last 3 Encounters:   11/08/18 223 lb (101.2 kg)   09/26/18 224 lb (101.6 kg)   07/12/18 222 lb (100.7 kg)            Labs reviewed in  EPIC    ROS:  Constitutional, HEENT, cardiovascular, pulmonary, gi and gu systems are negative, except as otherwise noted.    OBJECTIVE:     /88  Pulse 66  Temp 97.3  F (36.3  C) (Oral)  Resp 16  Ht 6' (1.829 m)  Wt 223 lb (101.2 kg)  SpO2 98%  BMI 30.24 kg/m2  Body mass index is 30.24 kg/(m^2).  GENERAL: healthy, alert and no distress  EYES: Eyes grossly normal to inspection, PERRL and conjunctivae and sclerae normal  HENT: ear canals and TM's normal, nose and mouth without ulcers or lesions  NECK: no adenopathy, no asymmetry, masses, or scars and thyroid normal to palpation  RESP: lungs clear to auscultation - no rales, rhonchi or wheezes  CV: regular rate and rhythm, normal S1 S2, no S3 or S4, no murmur, click or rub, no peripheral edema and peripheral pulses strong  ABDOMEN: soft, nontender, no hepatosplenomegaly, no masses and bowel sounds normal  MS: no gross musculoskeletal defects noted, no edema  SKIN: no suspicious lesions or rashes  NEURO: Normal strength and tone, mentation intact and speech normal  PSYCH: mentation appears normal, affect normal/bright    Diagnostic Test Results:  Results for orders placed or performed in visit on 11/08/18 (from the past 24 hour(s))   CBC with platelets differential   Result Value Ref Range    WBC 4.9 4.0 - 11.0 10e9/L    RBC Count 4.64 4.4 - 5.9 10e12/L    Hemoglobin 14.7 13.3 - 17.7 g/dL    Hematocrit 43.6 40.0 - 53.0 %    MCV 94 78 - 100 fl    MCH 31.7 26.5 - 33.0 pg    MCHC 33.7 31.5 - 36.5 g/dL    RDW 12.7 10.0 - 15.0 %    Platelet Count 183 150 - 450 10e9/L    % Neutrophils 58.7 %    % Lymphocytes 27.4 %    % Monocytes 10.8 %    % Eosinophils 2.7 %    % Basophils 0.4 %    Absolute Neutrophil 2.9 1.6 - 8.3 10e9/L    Absolute Lymphocytes 1.3 0.8 - 5.3 10e9/L    Absolute Monocytes 0.5 0.0 - 1.3 10e9/L    Absolute Eosinophils 0.1 0.0 - 0.7 10e9/L    Absolute Basophils 0.0 0.0 - 0.2 10e9/L    Diff Method Automated Method    ESR: Erythrocyte sedimentation  rate   Result Value Ref Range    Sed Rate 8 0 - 20 mm/h       ASSESSMENT/PLAN:         ICD-10-CM    1. Fatigue, unspecified type R53.83 CBC with platelets differential     TSH with free T4 reflex     Comprehensive metabolic panel     ESR: Erythrocyte sedimentation rate     CRP, inflammation     doxycycline (VIBRAMYCIN) 100 MG capsule   2. Arthralgia, unspecified joint M25.50 CBC with platelets differential     TSH with free T4 reflex     Comprehensive metabolic panel     ESR: Erythrocyte sedimentation rate     CRP, inflammation     doxycycline (VIBRAMYCIN) 100 MG capsule   suspect Lyme Dz. Start Doxy twice a day for 21 days.   warning signs discussed. side effects discussed  Labs pending for possible other causes of symptoms.   Recheck 4 wks as needed .    Joss Neumann PA-C  [facility]

## 2018-11-07 NOTE — TELEPHONE ENCOUNTER
Patient tested positive for Lyme's disease 6/8/18.   Left message on answering machine for patient to call back.  [place] [phone]  Reshma Reina RN

## 2018-11-07 NOTE — TELEPHONE ENCOUNTER
Patient states his symptoms from Lymes is back and wonders what he can do.  Please call.    Thank you.

## 2018-11-07 NOTE — TELEPHONE ENCOUNTER
Patient states that he is experiencing aching joints, lethargy, irritability.  He states has had these symptoms for a couple weeks.  No known tic bite.  Per Joss Neumann PA-C will need to be seen for assessment.  Appointment tomorrow.  Demetra Vasquez RN

## 2018-11-08 ENCOUNTER — OFFICE VISIT (OUTPATIENT)
Dept: FAMILY MEDICINE | Facility: CLINIC | Age: 68
End: 2018-11-08
Payer: COMMERCIAL

## 2018-11-08 VITALS
DIASTOLIC BLOOD PRESSURE: 88 MMHG | HEART RATE: 66 BPM | HEIGHT: 72 IN | TEMPERATURE: 97.3 F | WEIGHT: 223 LBS | BODY MASS INDEX: 30.2 KG/M2 | OXYGEN SATURATION: 98 % | RESPIRATION RATE: 16 BRPM | SYSTOLIC BLOOD PRESSURE: 137 MMHG

## 2018-11-08 DIAGNOSIS — R53.83 FATIGUE, UNSPECIFIED TYPE: Primary | ICD-10-CM

## 2018-11-08 DIAGNOSIS — M25.50 ARTHRALGIA, UNSPECIFIED JOINT: ICD-10-CM

## 2018-11-08 LAB
ALBUMIN SERPL-MCNC: 4 G/DL (ref 3.4–5)
ALP SERPL-CCNC: 68 U/L (ref 40–150)
ALT SERPL W P-5'-P-CCNC: 28 U/L (ref 0–70)
ANION GAP SERPL CALCULATED.3IONS-SCNC: 3 MMOL/L (ref 3–14)
AST SERPL W P-5'-P-CCNC: 23 U/L (ref 0–45)
BASOPHILS # BLD AUTO: 0 10E9/L (ref 0–0.2)
BASOPHILS NFR BLD AUTO: 0.4 %
BILIRUB SERPL-MCNC: 0.7 MG/DL (ref 0.2–1.3)
BUN SERPL-MCNC: 20 MG/DL (ref 7–30)
CALCIUM SERPL-MCNC: 8.7 MG/DL (ref 8.5–10.1)
CHLORIDE SERPL-SCNC: 106 MMOL/L (ref 94–109)
CO2 SERPL-SCNC: 31 MMOL/L (ref 20–32)
CREAT SERPL-MCNC: 1.15 MG/DL (ref 0.66–1.25)
CRP SERPL-MCNC: <2.9 MG/L (ref 0–8)
DIFFERENTIAL METHOD BLD: NORMAL
EOSINOPHIL # BLD AUTO: 0.1 10E9/L (ref 0–0.7)
EOSINOPHIL NFR BLD AUTO: 2.7 %
ERYTHROCYTE [DISTWIDTH] IN BLOOD BY AUTOMATED COUNT: 12.7 % (ref 10–15)
ERYTHROCYTE [SEDIMENTATION RATE] IN BLOOD BY WESTERGREN METHOD: 8 MM/H (ref 0–20)
GFR SERPL CREATININE-BSD FRML MDRD: 63 ML/MIN/1.7M2
GLUCOSE SERPL-MCNC: 88 MG/DL (ref 70–99)
HCT VFR BLD AUTO: 43.6 % (ref 40–53)
HGB BLD-MCNC: 14.7 G/DL (ref 13.3–17.7)
LYMPHOCYTES # BLD AUTO: 1.3 10E9/L (ref 0.8–5.3)
LYMPHOCYTES NFR BLD AUTO: 27.4 %
MCH RBC QN AUTO: 31.7 PG (ref 26.5–33)
MCHC RBC AUTO-ENTMCNC: 33.7 G/DL (ref 31.5–36.5)
MCV RBC AUTO: 94 FL (ref 78–100)
MONOCYTES # BLD AUTO: 0.5 10E9/L (ref 0–1.3)
MONOCYTES NFR BLD AUTO: 10.8 %
NEUTROPHILS # BLD AUTO: 2.9 10E9/L (ref 1.6–8.3)
NEUTROPHILS NFR BLD AUTO: 58.7 %
PLATELET # BLD AUTO: 183 10E9/L (ref 150–450)
POTASSIUM SERPL-SCNC: 4.9 MMOL/L (ref 3.4–5.3)
PROT SERPL-MCNC: 7.6 G/DL (ref 6.8–8.8)
RBC # BLD AUTO: 4.64 10E12/L (ref 4.4–5.9)
SODIUM SERPL-SCNC: 140 MMOL/L (ref 133–144)
T4 FREE SERPL-MCNC: 0.95 NG/DL (ref 0.76–1.46)
TSH SERPL DL<=0.005 MIU/L-ACNC: 0.14 MU/L (ref 0.4–4)
WBC # BLD AUTO: 4.9 10E9/L (ref 4–11)

## 2018-11-08 PROCEDURE — 99213 OFFICE O/P EST LOW 20 MIN: CPT | Performed by: PHYSICIAN ASSISTANT

## 2018-11-08 PROCEDURE — 85652 RBC SED RATE AUTOMATED: CPT | Performed by: PHYSICIAN ASSISTANT

## 2018-11-08 PROCEDURE — 84443 ASSAY THYROID STIM HORMONE: CPT | Performed by: PHYSICIAN ASSISTANT

## 2018-11-08 PROCEDURE — 80053 COMPREHEN METABOLIC PANEL: CPT | Performed by: PHYSICIAN ASSISTANT

## 2018-11-08 PROCEDURE — 86140 C-REACTIVE PROTEIN: CPT | Performed by: PHYSICIAN ASSISTANT

## 2018-11-08 PROCEDURE — 84439 ASSAY OF FREE THYROXINE: CPT | Performed by: PHYSICIAN ASSISTANT

## 2018-11-08 PROCEDURE — 85025 COMPLETE CBC W/AUTO DIFF WBC: CPT | Performed by: PHYSICIAN ASSISTANT

## 2018-11-08 PROCEDURE — 36415 COLL VENOUS BLD VENIPUNCTURE: CPT | Performed by: PHYSICIAN ASSISTANT

## 2018-11-08 RX ORDER — DOXYCYCLINE 100 MG/1
100 CAPSULE ORAL 2 TIMES DAILY
Qty: 42 CAPSULE | Refills: 0 | Status: SHIPPED | OUTPATIENT
Start: 2018-11-08 | End: 2019-03-12

## 2018-11-08 NOTE — MR AVS SNAPSHOT
After Visit Summary   11/8/2018    Viksah Tyalor    MRN: [id number]           Patient Information     Date Of Birth          1950        Visit Information        Provider Department      11/8/2018 12:50 PM Joss Neumann PA-C [facility]        Today's Diagnoses     Fatigue, unspecified type    -  1    Arthralgia, unspecified joint           Follow-ups after your visit        Follow-up notes from your care team     Return in about 4 weeks (around 12/6/2018) for As Needed.      Who to contact     If you have questions or need follow up information about today's clinic visit or your schedule please contact [facility] directly at [phone].  Normal or non-critical lab and imaging results will be communicated to you by MyChart, letter or phone within 4 business days after the clinic has received the results. If you do not hear from us within 7 days, please contact the clinic through [employer] or phone. If you have a critical or abnormal lab result, we will notify you by phone as soon as possible.  Submit refill requests through [facility] or call your pharmacy and they will forward the refill request to us. Please allow 3 business days for your refill to be completed.          Additional Information About Your Visit        MyChart Information     [facility] gives you secure access to your electronic health record. If you see a primary care provider, you can also send messages to your care team and make appointments. If you have questions, please call your primary care clinic.  If you do not have a primary care provider, please call [phone] and they will assist you.        Care EveryWhere ID     This is your Care EveryWhere ID. This could be used by other organizations to access your [place] medical records  [id number]        Your Vitals Were     Pulse Temperature Respirations Height Pulse Oximetry BMI (Body Mass Index)    66 97.3  F (36.3  C) (Oral) 16 6' (1.829  m) 98% 30.24 kg/m2       Blood Pressure from Last 3 Encounters:   11/08/18 137/88   09/26/18 140/84   07/12/18 138/88    Weight from Last 3 Encounters:   11/08/18 223 lb (101.2 kg)   09/26/18 224 lb (101.6 kg)   07/12/18 222 lb (100.7 kg)              We Performed the Following     CBC with platelets differential     Comprehensive metabolic panel     CRP, inflammation     ESR: Erythrocyte sedimentation rate     TSH with free T4 reflex          Today's Medication Changes          These changes are accurate as of 11/8/18  1:16 PM.  If you have any questions, ask your nurse or doctor.               Start taking these medicines.        Dose/Directions    doxycycline 100 MG capsule   Commonly known as:  VIBRAMYCIN   Used for:  Arthralgia, unspecified joint, Fatigue, unspecified type   Started by:  Joss Neumann PA-C        Dose:  100 mg   Take 1 capsule (100 mg) by mouth 2 times daily for 21 days   Quantity:  42 capsule   Refills:  0            Where to get your medicines      These medications were sent to [facility] [place] [id number]     Phone:  [phone]     doxycycline 100 MG capsule                Primary Care Provider Office Phone # Fax #    Joss Neumann PA-C [phone] [address] [facility] [address]        Equal Access to Services     DEISY BELTRÁN AH: Hadii collin zimmerman hadasho Soeduardoali, waaxda luqadaha, qaybta kaalmada adeegyada, carlene roy . So [facility] [phone].    ATENCIÓN: Si habla español, tiene a abraham disposición servicios gratuitos de asistencia lingüística. Llame al [phone].    We comply with applicable federal civil rights laws and [place] laws. We do not discriminate on the basis of race, color, national origin, age, disability, sex, sexual orientation, or gender identity.            Thank you!     Thank you for choosing [facility]  for your care. Our goal is always to provide you with excellent care. Hearing back from our patients is one way we can continue to improve our services. Please take a few minutes to complete the written survey that you may receive in the mail after your visit with us. Thank you!             Your Updated Medication List - Protect others around you: Learn how to safely use, store and throw away your medicines at www.disposemymeds.org.          This list is accurate as of 11/8/18  1:16 PM.  Always use your most recent med list.                   Brand Name Dispense Instructions for use Diagnosis    amLODIPine 5 MG tablet    NORVASC    90 tablet    Take 1 tablet (5 mg) by mouth daily    Hypertension goal BP (blood pressure) < 150/90       atorvastatin 20 MG tablet    LIPITOR    90 tablet    Take 1 tablet (20 mg) by mouth daily    High blood cholesterol       doxycycline 100 MG capsule    VIBRAMYCIN    42 capsule    Take 1 capsule (100 mg) by mouth 2 times daily for 21 days    Arthralgia, unspecified joint, Fatigue, unspecified type

## 2018-11-08 NOTE — NURSING NOTE
Chief Complaint   Patient presents with     Fatigue     Health Maintenance     UTD       Initial /88  Pulse 66  Temp 97.3  F (36.3  C) (Oral)  Resp 16  Ht 6' (1.829 m)  Wt 223 lb (101.2 kg)  SpO2 98%  BMI 30.24 kg/m2 Estimated body mass index is 30.24 kg/(m^2) as calculated from the following:    Height as of this encounter: 6' (1.829 m).    Weight as of this encounter: 223 lb (101.2 kg).  Medication Reconciliation: complete  Leila Olivarez, CMA

## 2018-11-09 NOTE — PROGRESS NOTES
Mr. Taylor,    All of your labs were normal/  near normal  for you.  Your TSH (thyroid test) was slightly abnormal. If the antibiotics don't end up helping you then recheck with me in about 2-3 months and we can recheck that test.     Please contact the clinic if you have additional questions.  Thank you.    Sincerely,    Joss Neumann PA-C

## 2018-12-01 ENCOUNTER — MYC MEDICAL ADVICE (OUTPATIENT)
Dept: FAMILY MEDICINE | Facility: CLINIC | Age: 68
End: 2018-12-01

## 2019-03-12 ENCOUNTER — OFFICE VISIT (OUTPATIENT)
Dept: FAMILY MEDICINE | Facility: CLINIC | Age: 69
End: 2019-03-12
Payer: COMMERCIAL

## 2019-03-12 VITALS
BODY MASS INDEX: 29.93 KG/M2 | TEMPERATURE: 98.2 F | WEIGHT: 221 LBS | OXYGEN SATURATION: 96 % | SYSTOLIC BLOOD PRESSURE: 136 MMHG | DIASTOLIC BLOOD PRESSURE: 85 MMHG | HEIGHT: 72 IN | HEART RATE: 66 BPM | RESPIRATION RATE: 16 BRPM

## 2019-03-12 DIAGNOSIS — I10 HYPERTENSION GOAL BP (BLOOD PRESSURE) < 150/90: ICD-10-CM

## 2019-03-12 DIAGNOSIS — E78.00 HIGH BLOOD CHOLESTEROL: ICD-10-CM

## 2019-03-12 DIAGNOSIS — M25.50 ARTHRALGIA, UNSPECIFIED JOINT: Primary | ICD-10-CM

## 2019-03-12 DIAGNOSIS — Z83.49 FH: THYROID DISEASE: ICD-10-CM

## 2019-03-12 LAB
ALBUMIN SERPL-MCNC: 3.7 G/DL (ref 3.4–5)
ALP SERPL-CCNC: 64 U/L (ref 40–150)
ALT SERPL W P-5'-P-CCNC: 33 U/L (ref 0–70)
ANION GAP SERPL CALCULATED.3IONS-SCNC: 3 MMOL/L (ref 3–14)
AST SERPL W P-5'-P-CCNC: 23 U/L (ref 0–45)
BILIRUB SERPL-MCNC: 0.6 MG/DL (ref 0.2–1.3)
BUN SERPL-MCNC: 21 MG/DL (ref 7–30)
CALCIUM SERPL-MCNC: 8.4 MG/DL (ref 8.5–10.1)
CHLORIDE SERPL-SCNC: 107 MMOL/L (ref 94–109)
CHOLEST SERPL-MCNC: 252 MG/DL
CO2 SERPL-SCNC: 29 MMOL/L (ref 20–32)
CREAT SERPL-MCNC: 1.09 MG/DL (ref 0.66–1.25)
CRP SERPL-MCNC: <2.9 MG/L (ref 0–8)
ERYTHROCYTE [SEDIMENTATION RATE] IN BLOOD BY WESTERGREN METHOD: 7 MM/H (ref 0–20)
GFR SERPL CREATININE-BSD FRML MDRD: 68 ML/MIN/{1.73_M2}
GLUCOSE SERPL-MCNC: 107 MG/DL (ref 70–99)
HDLC SERPL-MCNC: 52 MG/DL
LDLC SERPL CALC-MCNC: 169 MG/DL
NONHDLC SERPL-MCNC: 200 MG/DL
POTASSIUM SERPL-SCNC: 4.6 MMOL/L (ref 3.4–5.3)
PROT SERPL-MCNC: 7.1 G/DL (ref 6.8–8.8)
SODIUM SERPL-SCNC: 139 MMOL/L (ref 133–144)
TRIGL SERPL-MCNC: 156 MG/DL
TSH SERPL DL<=0.005 MIU/L-ACNC: 0.44 MU/L (ref 0.4–4)

## 2019-03-12 PROCEDURE — 80053 COMPREHEN METABOLIC PANEL: CPT | Performed by: PHYSICIAN ASSISTANT

## 2019-03-12 PROCEDURE — 86140 C-REACTIVE PROTEIN: CPT | Performed by: PHYSICIAN ASSISTANT

## 2019-03-12 PROCEDURE — 99214 OFFICE O/P EST MOD 30 MIN: CPT | Performed by: PHYSICIAN ASSISTANT

## 2019-03-12 PROCEDURE — 36415 COLL VENOUS BLD VENIPUNCTURE: CPT | Performed by: PHYSICIAN ASSISTANT

## 2019-03-12 PROCEDURE — 84443 ASSAY THYROID STIM HORMONE: CPT | Performed by: PHYSICIAN ASSISTANT

## 2019-03-12 PROCEDURE — 85652 RBC SED RATE AUTOMATED: CPT | Performed by: PHYSICIAN ASSISTANT

## 2019-03-12 PROCEDURE — 80061 LIPID PANEL: CPT | Performed by: PHYSICIAN ASSISTANT

## 2019-03-12 RX ORDER — ATORVASTATIN CALCIUM 20 MG/1
20 TABLET, FILM COATED ORAL DAILY
Qty: 90 TABLET | Refills: 3 | Status: SHIPPED | OUTPATIENT
Start: 2019-03-12 | End: 2022-01-04

## 2019-03-12 RX ORDER — NAPROXEN 500 MG/1
500 TABLET ORAL 2 TIMES DAILY WITH MEALS
Qty: 60 TABLET | Refills: 0 | Status: SHIPPED | OUTPATIENT
Start: 2019-03-12 | End: 2022-01-04

## 2019-03-12 RX ORDER — AMLODIPINE BESYLATE 5 MG/1
5 TABLET ORAL DAILY
Qty: 90 TABLET | Refills: 1 | Status: SHIPPED | OUTPATIENT
Start: 2019-03-12 | End: 2019-11-16

## 2019-03-12 ASSESSMENT — MIFFLIN-ST. JEOR: SCORE: 1800.45

## 2019-03-12 NOTE — PROGRESS NOTES
SUBJECTIVE:                                                    Vikash Taylor is a 68 year old male who presents to clinic today for the following health issues:    Hyperlipidemia Follow-Up    Answers for HPI/ROS submitted by the patient on 3/12/2019   Chronic problems general questions HPI Form  Low fat/chol diet rating:: Not monitoring fat  Taking Statins:: No  Lipid Medications or Supplements:: None      Hypertension Follow-up      Outpatient blood pressures are being checked at home very rarely. Results are normal, slightly high.    Low Salt Diet: not monitoring salt      Amount of exercise or physical activity: active per pt    Problems taking medications regularly: only takes Lipitor occasionally    Medication side effects: body aches from Lipitor     Diet: regular (no restrictions)    History of lyme's disease - having joint pain again. 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 Now over the last couple weeks notice joint pains throughout body. Worse in hands and wrist but affects all joints.     Problem list and histories reviewed & adjusted, as indicated.  Additional history: as documented      Patient Active Problem List   Diagnosis     Hypertriglyceridemia     Dyslipidemia     HYPERLIPIDEMIA LDL GOAL <160     Advanced directives, counseling/discussion     Shingles     Obesity     Wears hearing aid     Essential hypertension, benign     Heart murmur     SOB (shortness of breath)     Bilateral edema of lower extremity     Hypertension goal BP (blood pressure) < 150/90     Retinal detachment, unspecified laterality     Bilateral low back pain with left-sided sciatica, unspecified chronicity     Impaired fasting glucose     Cataract of right eye, unspecified cataract type     Lyme disease     Past Surgical History:   Procedure Laterality Date     ARTHROSCOPY KNEE RT/LT      ACL     COLONOSCOPY  6/6/2013     Procedure: COLONOSCOPY;  COLONSCOPY-SCREENING;  Surgeon: Vikash Brown MD;  Location: [facility] OR     LITHOTRIPSY      Lithotrypsy     SURGICAL HISTORY OF -       toe fusion     TONSILLECTOMY         Social History     Tobacco Use     Smoking status: Former Smoker     Packs/day: 1.00     Years: 35.00     Pack years: 35.00     Types: Cigarettes, Cigars, Dip, chew, snus or snuff     Start date: 6/15/1965     Last attempt to quit: 5/1/2015     Years since quitting: 3.8     Smokeless tobacco: Former User     Quit date: 1/1/2015   Substance Use Topics     Alcohol use: Yes     Alcohol/week: 0.0 oz     Comment: occ     Family History   Problem Relation Age of Onset     Diabetes Mother      Hypertension Mother      Cerebrovascular Disease Mother      Heart Disease Father         chf     Diabetes Father      Coronary Artery Disease Father      Hypertension Father      Cerebrovascular Disease Father      Other Cancer Brother          Current Outpatient Medications   Medication Sig Dispense Refill     amLODIPine (NORVASC) 5 MG tablet Take 1 tablet (5 mg) by mouth daily 90 tablet 1     atorvastatin (LIPITOR) 20 MG tablet Take 1 tablet (20 mg) by mouth daily 90 tablet 3     naproxen (NAPROSYN) 500 MG tablet Take 1 tablet (500 mg) by mouth 2 times daily (with meals) 60 tablet 0     Allergies   Allergen Reactions     Nkda [No Known Drug Allergies]      BP Readings from Last 3 Encounters:   03/12/19 136/85   11/08/18 137/88   09/26/18 140/84    Wt Readings from Last 3 Encounters:   03/12/19 100.2 kg (221 lb)   11/08/18 101.2 kg (223 lb)   09/26/18 101.6 kg (224 lb)                  Labs reviewed in EPIC    ROS:  Constitutional, HEENT, cardiovascular, pulmonary, gi and gu systems are negative, except as otherwise noted.    OBJECTIVE:     /85   Pulse 66   Temp 98.2  F (36.8  C) (Oral)   Resp 16   Ht 1.829 m (6')   Wt 100.2 kg (221 lb)   SpO2 96%   BMI 29.97 kg/m    Body mass index is 29.97 kg/m .  GENERAL: healthy,  alert and no distress  EYES: Eyes grossly normal to inspection, PERRL and conjunctivae and sclerae normal  HENT: ear canals and TM's normal, nose and mouth without ulcers or lesions  NECK: no adenopathy, no asymmetry, masses, or scars and thyroid normal to palpation  RESP: lungs clear to auscultation - no rales, rhonchi or wheezes  CV: regular rate and rhythm, normal S1 S2, no S3 or S4, no murmur, click or rub, no peripheral edema and peripheral pulses strong  ABDOMEN: soft, nontender, no hepatosplenomegaly, no masses and bowel sounds normal  MS: no gross musculoskeletal defects noted, no edema  NEURO: Normal strength and tone, mentation intact and speech normal  PSYCH: mentation appears normal, affect normal/bright    Diagnostic Test Results:  No results found for this or any previous visit (from the past 24 hour(s)).    ASSESSMENT/PLAN:       ICD-10-CM    1. Arthralgia, unspecified joint M25.50 ESR: Erythrocyte sedimentation rate     CRP, inflammation     naproxen (NAPROSYN) 500 MG tablet   2. Hypertension goal BP (blood pressure) < 150/90 I10 Comprehensive metabolic panel (BMP + Alb, Alk Phos, ALT, AST, Total. Bili, TP)     amLODIPine (NORVASC) 5 MG tablet   3. High blood cholesterol E78.00 Lipid panel reflex to direct LDL Fasting     atorvastatin (LIPITOR) 20 MG tablet   4. FH: thyroid disease Z83.49 TSH with free T4 reflex   1. Labs pending. Doesn't make since this time of year to have lyme dz again. Suspect arthralgias are from arthritis/ and kitchen remodel work. Ok for naproxen as needed . warning signs discussed. side effects discussed   2-3. Stable ok for refills. Recheck blood pressure in 6 months.      Joss Neumann PA-C  [facility]

## 2019-03-28 ENCOUNTER — TRANSFERRED RECORDS (OUTPATIENT)
Dept: HEALTH INFORMATION MANAGEMENT | Facility: CLINIC | Age: 69
End: 2019-03-28

## 2019-11-07 ENCOUNTER — MYC MEDICAL ADVICE (OUTPATIENT)
Dept: FAMILY MEDICINE | Facility: CLINIC | Age: 69
End: 2019-11-07

## 2019-11-07 ENCOUNTER — TELEPHONE (OUTPATIENT)
Dept: FAMILY MEDICINE | Facility: CLINIC | Age: 69
End: 2019-11-07

## 2019-11-09 ENCOUNTER — HEALTH MAINTENANCE LETTER (OUTPATIENT)
Age: 69
End: 2019-11-09

## 2019-11-16 DIAGNOSIS — I10 HYPERTENSION GOAL BP (BLOOD PRESSURE) < 150/90: ICD-10-CM

## 2019-11-16 NOTE — LETTER
November 19, 2019    Vikash Taylor  [address] [facility] [address]    Dear Vikash,       We recently received a refill request for AMLODIPine (NORVASC) 5 MG tablet.  We have refilled this for a one time 30 day supply only because you are due for a:    Wellness Exam/Blood Pressure Check office visit      Please call at your earliest convenience so that there will not be a delay with your future refills.          Thank you,   Your [facility] Team/mkr  [phone]

## 2019-11-19 RX ORDER — AMLODIPINE BESYLATE 5 MG/1
TABLET ORAL
Qty: 30 TABLET | Refills: 0 | Status: SHIPPED | OUTPATIENT
Start: 2019-11-19 | End: 2019-12-12

## 2019-11-19 NOTE — TELEPHONE ENCOUNTER
Amlodipine 30 day refill given.  Per 3/12/19 OV was to follow up with recheck blood pressure in 6 months.  He was notified via [employer] 11/7/19  No pending appt

## 2019-12-12 DIAGNOSIS — I10 HYPERTENSION GOAL BP (BLOOD PRESSURE) < 150/90: ICD-10-CM

## 2019-12-12 RX ORDER — AMLODIPINE BESYLATE 5 MG/1
TABLET ORAL
Qty: 30 TABLET | Refills: 0 | Status: SHIPPED | OUTPATIENT
Start: 2019-12-12 | End: 2023-01-06

## 2019-12-12 NOTE — TELEPHONE ENCOUNTER
Routing refill request to provider for review/approval because:  Marcia given x1 and patient did not follow up, please advise  Lin Samano BSN, RN

## 2020-01-06 ENCOUNTER — OFFICE VISIT (OUTPATIENT)
Dept: URGENT CARE | Facility: URGENT CARE | Age: 70
End: 2020-01-06
Payer: COMMERCIAL

## 2020-01-06 VITALS
DIASTOLIC BLOOD PRESSURE: 84 MMHG | SYSTOLIC BLOOD PRESSURE: 140 MMHG | TEMPERATURE: 97.6 F | HEART RATE: 86 BPM | OXYGEN SATURATION: 95 %

## 2020-01-06 DIAGNOSIS — M70.21 OLECRANON BURSITIS OF RIGHT ELBOW: Primary | ICD-10-CM

## 2020-01-06 PROCEDURE — 99213 OFFICE O/P EST LOW 20 MIN: CPT | Performed by: NURSE PRACTITIONER

## 2020-01-06 ASSESSMENT — ENCOUNTER SYMPTOMS
CARDIOVASCULAR NEGATIVE: 1
FALLS: 0
CONSTITUTIONAL NEGATIVE: 1
NEUROLOGICAL NEGATIVE: 1
RESPIRATORY NEGATIVE: 1

## 2020-01-06 NOTE — PATIENT INSTRUCTIONS
Patient Education     Bursitis    You have bursitis. This is an inflammation of the bursa. These are small, fluid-filled sacs that surround the larger joints of the body. The bursa help the muscles and tendons move smoothly over the joints.  Bursitis often happens in the shoulder. But it can also affect the elbows, hips, pelvis, knees, toes, and heels. Bursitis can be caused by injury, overuse of the joint, or infection of the bursa. Symptoms include pain and tenderness over a joint. Symptoms get worse with movement.  Bursitis is treated with an anti-inflammatory medicine and by resting the joint. More severe cases require injection of medicine directly into the bursa. In the case of infection, surgery and antibiotics may be needed.  Home care    Rest the painful joint and protect it from movement. This will allow the inflammation to heal faster.    Apply an ice pack over the injured area for no more than 15 to 20 minutes. Do this every 3 to 6 hours for the first 24 to 48 hours. Keep using ice packs 3 to 4 times a day until the pain and swelling improves.     To make an ice pack, put ice cubes in a sealed plastic bag. Wrap the bag in a clean, thin towel or cloth. Never put ice or an ice pack directly on the skin. As the ice melts, be careful to not to get the wrap or splint wet.    You may take over-the-counter pain medicine to treat pain and inflammation, unless another medicine was prescribed. Anti-inflammatory pain medicines may be more effective. Talk with your provider before using these medicines if you have chronic liver or kidney disease, or ever had a stomach ulcer or gastrointestinal bleeding.    As your symptoms improve, slowly begin to move the joint. Don't overuse the joint. This may cause the symptoms to flare up again.  When to seek medical advice  Call your healthcare provider right away if any of these occur:    Redness or warmth over the painful area    Increasing pain or swelling at the  joint    Fever of 100.4 F (38 C) or above lasting for 24 to 48 hours, or as advised    Chills  Date Last Reviewed: 5/1/2018 2000-2019 [employer]. [address] [place] [address], [place] [address]. All rights reserved. This information is not intended as a substitute for professional medical care. Always follow your healthcare professional's instructions.

## 2020-01-06 NOTE — PROGRESS NOTES
HPI  Vikash Taylor 69 year old male who presents with acute onset of swelling at the distal tip of his left elbow.  He reports that he slipped and fell in September and did injure the elbows mildly but has not had any symptoms until today.  The swelling is not particularly painful, there is no erythema, and he denies any fevers.  No treatments have been initiated prior to his arrival in the urgent care.      Review of Systems   Constitutional: Negative.    Respiratory: Negative.    Cardiovascular: Negative.    Musculoskeletal: Negative for falls and joint pain.        Swelling right elbow   Skin: Negative.    Neurological: Negative.    Endo/Heme/Allergies: Negative.      Past Medical History:   Diagnosis Date     Calculi, renal      Essential hypertension, benign 4/26/2016     Heart murmur 5/11/2016     Hypertension goal BP (blood pressure) < 150/90 5/3/2017     Hypogonadism male 2/8/2010     Lyme disease 7/12/2018     Shingles      Testicular atrophy      Patient Active Problem List   Diagnosis     Hypertriglyceridemia     Dyslipidemia     HYPERLIPIDEMIA LDL GOAL <160     Advanced directives, counseling/discussion     Shingles     Obesity     Wears hearing aid     Essential hypertension, benign     Heart murmur     SOB (shortness of breath)     Bilateral edema of lower extremity     Hypertension goal BP (blood pressure) < 150/90     Retinal detachment, unspecified laterality     Bilateral low back pain with left-sided sciatica, unspecified chronicity     Impaired fasting glucose     Cataract of right eye, unspecified cataract type     Lyme disease      Past Surgical History:   Procedure Laterality Date     ARTHROSCOPY KNEE RT/LT      ACL     COLONOSCOPY  6/6/2013    Procedure: COLONOSCOPY;  COLONSCOPY-SCREENING;  Surgeon: Vikash Brown MD;  Location: [facility]     LITHOTRIPSY      Lithotrypsy     SURGICAL HISTORY OF -       toe fusion     TONSILLECTOMY       Allergies   Allergen Reactions     Nkda [No  Known Drug Allergies]      Current Outpatient Medications   Medication     amLODIPine (NORVASC) 5 MG tablet     atorvastatin (LIPITOR) 20 MG tablet     naproxen (NAPROSYN) 500 MG tablet     No current facility-administered medications for this visit.          Physical Exam  Vitals signs and nursing note reviewed.   Constitutional:       General: He is not in acute distress.     Comments: BP (!) 140/84   Pulse 86   Temp 97.6  F (36.4  C) (Tympanic)   SpO2 95%      HENT:      Head: Normocephalic.   Cardiovascular:      Rate and Rhythm: Normal rate.   Pulmonary:      Effort: Pulmonary effort is normal.   Musculoskeletal: Normal range of motion.         General: Swelling present.      Right elbow: He exhibits swelling. He exhibits no deformity and no laceration. No tenderness found.   Skin:     General: Skin is warm and dry.   Neurological:      Mental Status: He is alert and oriented to person, place, and time.       Assessment:  1. Olecranon bursitis of right elbow        Plan:  Ace, Icing, Elevation, ibuprofen or naproxen sodium  Instructions regarding self-care of patient with olecranon bursitis reviewed.   Written instructions provided in after visit summary and reviewed.  Patient instructed to see primary care provider for new or persistent symptoms.   Red flag symptoms reviewed and patient has been instructed to seek emergent care  Please contact pharmacy for medication questions.  Patient instructed to take medications as directed on package.      Lisa Owens, DNP, APRN, CNP

## 2020-02-10 ENCOUNTER — E-VISIT (OUTPATIENT)
Dept: FAMILY MEDICINE | Facility: CLINIC | Age: 70
End: 2020-02-10
Payer: COMMERCIAL

## 2020-02-10 DIAGNOSIS — J40 BRONCHITIS: Primary | ICD-10-CM

## 2020-02-10 PROCEDURE — 99421 OL DIG E/M SVC 5-10 MIN: CPT | Performed by: PHYSICIAN ASSISTANT

## 2020-02-10 RX ORDER — AZITHROMYCIN 250 MG/1
TABLET, FILM COATED ORAL
Qty: 6 TABLET | Refills: 0 | Status: SHIPPED | OUTPATIENT
Start: 2020-02-10 | End: 2020-02-19

## 2020-02-18 ENCOUNTER — MYC MEDICAL ADVICE (OUTPATIENT)
Dept: FAMILY MEDICINE | Facility: CLINIC | Age: 70
End: 2020-02-18

## 2020-02-19 ENCOUNTER — OFFICE VISIT (OUTPATIENT)
Dept: FAMILY MEDICINE | Facility: CLINIC | Age: 70
End: 2020-02-19
Payer: COMMERCIAL

## 2020-02-19 VITALS
RESPIRATION RATE: 18 BRPM | OXYGEN SATURATION: 96 % | TEMPERATURE: 97.8 F | DIASTOLIC BLOOD PRESSURE: 89 MMHG | HEIGHT: 72 IN | HEART RATE: 75 BPM | WEIGHT: 230 LBS | BODY MASS INDEX: 31.15 KG/M2 | SYSTOLIC BLOOD PRESSURE: 170 MMHG

## 2020-02-19 DIAGNOSIS — I10 HYPERTENSION GOAL BP (BLOOD PRESSURE) < 150/90: ICD-10-CM

## 2020-02-19 DIAGNOSIS — R05.9 COUGH: ICD-10-CM

## 2020-02-19 DIAGNOSIS — J01.90 ACUTE SINUSITIS WITH SYMPTOMS > 10 DAYS: Primary | ICD-10-CM

## 2020-02-19 PROCEDURE — 99214 OFFICE O/P EST MOD 30 MIN: CPT | Performed by: INTERNAL MEDICINE

## 2020-02-19 RX ORDER — BENZONATATE 100 MG/1
100 CAPSULE ORAL 3 TIMES DAILY PRN
Qty: 25 CAPSULE | Refills: 0 | Status: SHIPPED | OUTPATIENT
Start: 2020-02-19 | End: 2022-01-04

## 2020-02-19 RX ORDER — ALBUTEROL SULFATE 90 UG/1
2 AEROSOL, METERED RESPIRATORY (INHALATION) EVERY 4 HOURS PRN
Qty: 1 INHALER | Refills: 0 | Status: SHIPPED | OUTPATIENT
Start: 2020-02-19 | End: 2022-01-04

## 2020-02-19 ASSESSMENT — MIFFLIN-ST. JEOR: SCORE: 1836.27

## 2020-02-19 NOTE — PATIENT INSTRUCTIONS
Vikash to follow up with Primary Care provider regarding elevated blood pressure.. Recheck BP in 1-2 weeks with a nurse visit, [place] pharmacy visit, or a visit to primary care doctor.

## 2020-02-19 NOTE — PROGRESS NOTES
SUBJECTIVE:  Vikash Taylor is an 69 year old male who presents for not feeling well.  Was recently on a cruise in [facility]. Felt fine on his trip.  Then three days after flying home started to have some cough. That was three weeks or so ago.  No aching.  Has h/o sinus infections and this has felt like one along with a cough.  Did an e-visit and was put on zmax on 2/10 which he completed.  Did feel some improvement with that, but then cough worsened over the past couple days.  Cough is sometimes productive of yellow sputum.  Has a lot of drainage down back of throat and a little sore throat.  Still has a little pressure in sinuses.  No fevers.  Did have some sweats last night, but no fever when checked.  Sometimes has a little ear pain.  No known exposures other than having been on airplane.  Has taken some mucinex which helped a little.      PMH:   has a past medical history of Calculi, renal, Essential hypertension, benign (4/26/2016), Heart murmur (5/11/2016), Hypertension goal BP (blood pressure) < 150/90 (5/3/2017), Hypogonadism male (2/8/2010), Lyme disease (7/12/2018), Shingles, and Testicular atrophy.  Patient Active Problem List   Diagnosis     Hypertriglyceridemia     Dyslipidemia     HYPERLIPIDEMIA LDL GOAL <160     Advanced directives, counseling/discussion     Shingles     Obesity     Wears hearing aid     Essential hypertension, benign     Heart murmur     SOB (shortness of breath)     Bilateral edema of lower extremity     Hypertension goal BP (blood pressure) < 150/90     Retinal detachment, unspecified laterality     Bilateral low back pain with left-sided sciatica, unspecified chronicity     Impaired fasting glucose     Cataract of right eye, unspecified cataract type     Lyme disease     Social History     Socioeconomic History     Marital status:      Spouse name: None     Number of children: None     Years of education: None     Highest education level: None   Occupational History      None   Social Needs     Financial resource strain: None     Food insecurity:     Worry: None     Inability: None     Transportation needs:     Medical: None     Non-medical: None   Tobacco Use     Smoking status: Former Smoker     Packs/day: 1.00     Years: 35.00     Pack years: 35.00     Types: Cigarettes, Cigars, Dip, chew, snus or snuff     Start date: 6/15/1965     Last attempt to quit: 2015     Years since quittin.8     Smokeless tobacco: Former User     Quit date: 2015   Substance and Sexual Activity     Alcohol use: Yes     Alcohol/week: 0.0 standard drinks     Comment: occ     Drug use: No     Sexual activity: Yes     Partners: Female   Lifestyle     Physical activity:     Days per week: None     Minutes per session: None     Stress: None   Relationships     Social connections:     Talks on phone: None     Gets together: None     Attends Synagogue service: None     Active member of club or organization: None     Attends meetings of clubs or organizations: None     Relationship status: None     Intimate partner violence:     Fear of current or ex partner: None     Emotionally abused: None     Physically abused: None     Forced sexual activity: None   Other Topics Concern     Parent/sibling w/ CABG, MI or angioplasty before 65F 55M? No   Social History Narrative     None     Family History   Problem Relation Age of Onset     Diabetes Mother      Hypertension Mother      Cerebrovascular Disease Mother      Heart Disease Father         chf     Diabetes Father      Coronary Artery Disease Father      Hypertension Father      Cerebrovascular Disease Father      Other Cancer Brother        ALLERGIES:  Nkda [no known drug allergies]    Current Outpatient Medications   Medication     amLODIPine (NORVASC) 5 MG tablet     atorvastatin (LIPITOR) 20 MG tablet     naproxen (NAPROSYN) 500 MG tablet     No current facility-administered medications for this visit.          ROS:  ROS is done and is negative for  general/constitutional, eye, ENT, Respiratory, cardiovascular, GI, , Skin, musculoskeletal except as noted elsewhere.  All other review of systems negative except as noted elsewhere.      OBJECTIVE:  BP (!) 170/89   Pulse 75   Temp 97.8  F (36.6  C) (Oral)   Resp 18   Ht 1.829 m (6')   Wt 104.3 kg (230 lb)   SpO2 96%   BMI 31.19 kg/m    GENERAL APPEARANCE: Alert, in no acute distress  EYES: normal  EARS: External ears normal. Canals clear. TM's normal.  NOSE:mildly inflamed mucosa  OROPHARYNX:normal  SINUSES: tender over maxillary  NECK:No adenopathy,masses or thyromegaly  RESP: mildly prolonged exp phase.  No crackles.  No wheezing.  CV:regular rate and rhythm and no murmurs, clicks, or gallops  ABDOMEN: Abdomen soft, non-tender. BS normal. No masses, organomegaly  SKIN: no ulcers, lesions or rash  MUSCULOSKELETAL:Musculoskeletal normal      RESULTS  No results found for any visits on 02/19/20..  No results found for this or any previous visit (from the past 48 hour(s)).    ASSESSMENT/PLAN:    ASSESSMENT / PLAN:  (J01.90) Acute sinusitis with symptoms > 10 days  (primary encounter diagnosis)  Comment: has been on zmax which initially helped but sxs have recurred.  Plan: amoxicillin-clavulanate 875-125 MG PO tablet        Reviewed medication instructions and side effects. Follow up if experiences side effects.. I reviewed supportive care, otc meds to use if needed, expected course, and signs of concern.  Follow up as needed or if he does not improve within 7 day(s) or if worsens in any way.  Reviewed red flag symptoms and is to go to the ER if experiences any of these.    (R05) Cough  Comment: has recently worsened.  Pt starting on augmentin for sinusitis, which would cover most bacterial lung issues, although currently no evidence of pneumonia on exam.  Will rx albuterol and benzonatate prn for cough.  Plan: amoxicillin-clavulanate 875-125 MG PO tablet,         albuterol 108 (90 Base) MCG/ACT IN inhaler,          benzonatate 100 MG PO capsule        Reviewed medication instructions and side effects. Follow up if experiences side effects.. I reviewed supportive care, otc meds to use if needed, expected course, and signs of concern.  Follow up as needed or if he does not improve within 1 week(s) or if worsens in any way.  Reviewed red flag symptoms and is to go to the ER if experiences any of these.    (I10) Hypertension goal BP (blood pressure) < 150/90  Comment: bp above goal, which may be due to illness and discomfort  Plan: continue current med for now.  Recheck BP in 1-2 weeks with a nurse visit, [place] pharmacy visit, or a visit to primary care doctor.    See [facility] for orders, medications, letters, patient instructions    Emilia Ordaz M.D.

## 2020-02-20 ENCOUNTER — TELEPHONE (OUTPATIENT)
Dept: FAMILY MEDICINE | Facility: CLINIC | Age: 70
End: 2020-02-20

## 2020-02-20 NOTE — TELEPHONE ENCOUNTER
Reason for Call:  Form, our goal is to have forms completed with 72 hours, however, some forms may require a visit or additional information.    Type of letter, form or note:  [facility]    Who is the form from?: Patient    Where did the form come from: Patient or family brought in       What clinic location was the form placed at?: [place]    Where the form was placed: [facility]    What number is listed as a contact on the form?: [phone]       Additional comments: pt wanted his care team to have this brochure.  [facility]  Thank you!      Call taken on 2/20/2020 at 12:54 PM by Sis Campbell

## 2020-02-20 NOTE — TELEPHONE ENCOUNTER
Called patient  @ [phone] to get clarification on the medication he is requesting and which [facility] he'd [facility] to. He did not answer. I left a detailed voicemail for him to return my call to my direct line. #[phone].  MILIND Key

## 2020-02-20 NOTE — TELEPHONE ENCOUNTER
Care team:  I picked up the brochure the patient dropped off at the . I have it if you need it.  Tami Price TC

## 2020-02-21 NOTE — TELEPHONE ENCOUNTER
Patient called back. There is no form that needs to be filled out.   He explained going forward [facility] [place] just in [facility] to have his prescriptions filled at much more reasonable cost to him. However he will maintain his Primary Care with [facility]. The inhaler he asked about was just filled so at this time he does not need another one. No further action needed at this time.     Tami Price, TC

## 2020-02-23 ENCOUNTER — HEALTH MAINTENANCE LETTER (OUTPATIENT)
Age: 70
End: 2020-02-23

## 2020-05-05 ENCOUNTER — TELEPHONE (OUTPATIENT)
Dept: FAMILY MEDICINE | Facility: CLINIC | Age: 70
End: 2020-05-05

## 2020-05-05 DIAGNOSIS — M54.42 BILATERAL LOW BACK PAIN WITH LEFT-SIDED SCIATICA, UNSPECIFIED CHRONICITY: Primary | ICD-10-CM

## 2020-05-05 NOTE — TELEPHONE ENCOUNTER
Reason for Call:  Other call back    Detailed comments: pt at physical therapy now and he needs a pre auth for new ins.  Please call him immediately    Phone Number Patient can be reached at: Cell number on file:    Telephone Information:   Mobile [phone]       Best Time: any    Can we leave a detailed message on this number? Not Applicable    Call taken on 5/5/2020 at 1:24 PM by Sis Campbell

## 2020-05-05 NOTE — TELEPHONE ENCOUNTER
To Provider,  I called and spoke to the patient @ [phone]. He has since switched insurance carriers. He has been having PT done for his back and sciatica issues at [facility] in [place], [place]. His new insurance plan is requiring a Referral/Order be written please. Please advise.    Thank you.    TC- please fax referral/order to [facility] # [phone].    Thank you,  Tami Price TC

## 2020-06-12 ENCOUNTER — TELEPHONE (OUTPATIENT)
Dept: FAMILY MEDICINE | Facility: CLINIC | Age: 70
End: 2020-06-12

## 2020-06-12 NOTE — TELEPHONE ENCOUNTER
Referral to [facility] for physical therapy was placed 5/6     - please fax referral    Sonal LINN, RN, CPN

## 2020-06-12 NOTE — TELEPHONE ENCOUNTER
Reason for Call: Request for an order or referral:    Order or referral being requested: referral    Date needed: as soon as possible    Has the patient been seen by the PCP for this problem? YES    Additional comments: patient has an appointment on Monday for therapy @ [facility] phone number is [phone] would like the referral to be fax there   Thank you     Phone number Patient can be reached at:  Cell number on file:    Telephone Information:   Mobile [phone]       Best Time:  any    Can we leave a detailed message on this number?  YES    Call taken on 6/12/2020 at 10:52 AM by Radha Ellis

## 2020-06-12 NOTE — TELEPHONE ENCOUNTER
Physical therapy referral dated 05/06/2020 faxed to [facility]  #[phone].  MILIND Key

## 2020-08-14 NOTE — PROGRESS NOTES
Patient was referred to Audiology from ENT by Dr. Leong for a hearing examination. Patient reports known hearing loss through  service. Patient reports an ear infection in his left ear. Patient also reports bilateral longstanding tinnitus.     Testing:    Otoscopy:   Clear canals free of cerumen bilaterally.     Tympanograms:   Tympanometric findings were Normal ear canal volume and compliance (Type A) bilaterally.     Thresholds:   Puretone thresholds obtained with insert earphones show mild to profound mostly symmetrical primarily sensorineural hearing loss (see scanned audiogram). Speech reception thresholds were in agreement with puretone findings bilaterally. Speech discrimination scores were good to excellent (Right: 96%; Left: 84%).     Discussed results with the patient.     Patient was returned to ENT for follow up.     Eleazar Santamaria CCC-A  Licensed Audiologist  1/11/2017       No

## 2021-01-18 NOTE — TELEPHONE ENCOUNTER
"Patient c/o abdominal/back pain, right side.  Patient stated this is a result of frequent cough.  \"now it hurts to take a deep breath\".  Patient states has had a cough ~ 4+ weeks.  Was treated via Evisit with antibiotic. Advise evaluation.   Next 5 appointments (look out 90 days)    Feb 19, 2020 10:20 AM CST  [facility] [id number] [facility] [id number] [phone]        Patient denies radiating pain, shortness of breath, dyspnea on exertion.  Warning symptoms that would need to be seen promptly at Emergency Room or require calling 911 include,  Chest pain, radiating pain, shortness of breath, painful breathing with exertion, nausea, sweating, accelerated heart rate, palpitations, worst headache of your life, calf pain/swelling/redness.    Patient/parent verbalized understanding of instructions provided and agreed with the plan of care  Reshma Reina RN     "
Principal Discharge DX:	Back pain

## 2021-03-10 ENCOUNTER — MYC MEDICAL ADVICE (OUTPATIENT)
Dept: FAMILY MEDICINE | Facility: CLINIC | Age: 71
End: 2021-03-10

## 2021-03-11 ENCOUNTER — TRANSFERRED RECORDS (OUTPATIENT)
Dept: HEALTH INFORMATION MANAGEMENT | Facility: CLINIC | Age: 71
End: 2021-03-11

## 2021-03-17 ENCOUNTER — TRANSFERRED RECORDS (OUTPATIENT)
Dept: HEALTH INFORMATION MANAGEMENT | Facility: CLINIC | Age: 71
End: 2021-03-17

## 2021-03-17 LAB
CREAT SERPL-MCNC: 1 MG/DL (ref 0.7–1.2)
GLUCOSE SERPL-MCNC: 95 MG/DL (ref 70–105)
POTASSIUM SERPL-SCNC: 4.6 MMOL/L (ref 3.5–5.1)

## 2021-03-26 ENCOUNTER — TELEPHONE (OUTPATIENT)
Dept: FAMILY MEDICINE | Facility: CLINIC | Age: 71
End: 2021-03-26

## 2021-03-26 NOTE — TELEPHONE ENCOUNTER
Looks like [facility]. Sent copy to scanning, will inform patient via [employer]. Noemi Ahn -

## 2021-03-26 NOTE — TELEPHONE ENCOUNTER
Patient dropped forms to be added to chart his chart. It is a med list. He would like iloho message when complete. In box with blue form      Isabelle Blankenship

## 2021-04-11 ENCOUNTER — HEALTH MAINTENANCE LETTER (OUTPATIENT)
Age: 71
End: 2021-04-11

## 2021-04-28 ENCOUNTER — TRANSFERRED RECORDS (OUTPATIENT)
Dept: HEALTH INFORMATION MANAGEMENT | Facility: CLINIC | Age: 71
End: 2021-04-28

## 2021-09-26 ENCOUNTER — HEALTH MAINTENANCE LETTER (OUTPATIENT)
Age: 71
End: 2021-09-26

## 2022-01-01 ASSESSMENT — ENCOUNTER SYMPTOMS
NAUSEA: 0
HEARTBURN: 0
PALPITATIONS: 0
ARTHRALGIAS: 1
MYALGIAS: 1
SORE THROAT: 0
HEADACHES: 0
FREQUENCY: 0
JOINT SWELLING: 0
DYSURIA: 0
DIARRHEA: 0
NERVOUS/ANXIOUS: 0
EYE PAIN: 0
FEVER: 0
PARESTHESIAS: 0
CHILLS: 0
HEMATOCHEZIA: 0
ABDOMINAL PAIN: 0
CONSTIPATION: 0
WEAKNESS: 0
HEMATURIA: 0
DIZZINESS: 0
COUGH: 0
SHORTNESS OF BREATH: 0

## 2022-01-01 ASSESSMENT — ACTIVITIES OF DAILY LIVING (ADL): CURRENT_FUNCTION: NO ASSISTANCE NEEDED

## 2022-01-04 ENCOUNTER — OFFICE VISIT (OUTPATIENT)
Dept: FAMILY MEDICINE | Facility: CLINIC | Age: 72
End: 2022-01-04
Payer: COMMERCIAL

## 2022-01-04 VITALS
HEIGHT: 72 IN | WEIGHT: 226 LBS | DIASTOLIC BLOOD PRESSURE: 90 MMHG | HEART RATE: 68 BPM | SYSTOLIC BLOOD PRESSURE: 150 MMHG | BODY MASS INDEX: 30.61 KG/M2 | TEMPERATURE: 97.7 F | OXYGEN SATURATION: 96 % | RESPIRATION RATE: 16 BRPM

## 2022-01-04 DIAGNOSIS — Z00.00 ENCOUNTER FOR MEDICARE ANNUAL WELLNESS EXAM: Primary | ICD-10-CM

## 2022-01-04 DIAGNOSIS — Z12.5 SCREENING PSA (PROSTATE SPECIFIC ANTIGEN): ICD-10-CM

## 2022-01-04 DIAGNOSIS — I10 HYPERTENSION GOAL BP (BLOOD PRESSURE) < 150/90: ICD-10-CM

## 2022-01-04 DIAGNOSIS — Z20.822 COVID-19 RULED OUT: ICD-10-CM

## 2022-01-04 DIAGNOSIS — E78.5 DYSLIPIDEMIA: ICD-10-CM

## 2022-01-04 LAB
ANION GAP SERPL CALCULATED.3IONS-SCNC: 5 MMOL/L (ref 3–14)
BUN SERPL-MCNC: 18 MG/DL (ref 7–30)
CALCIUM SERPL-MCNC: 9.1 MG/DL (ref 8.5–10.1)
CHLORIDE BLD-SCNC: 106 MMOL/L (ref 94–109)
CHOLEST SERPL-MCNC: 243 MG/DL
CO2 SERPL-SCNC: 28 MMOL/L (ref 20–32)
CREAT SERPL-MCNC: 1.1 MG/DL (ref 0.66–1.25)
FASTING STATUS PATIENT QL REPORTED: YES
GFR SERPL CREATININE-BSD FRML MDRD: 71 ML/MIN/1.73M2
GLUCOSE BLD-MCNC: 107 MG/DL (ref 70–99)
HDLC SERPL-MCNC: 48 MG/DL
LDLC SERPL CALC-MCNC: 150 MG/DL
NONHDLC SERPL-MCNC: 195 MG/DL
POTASSIUM BLD-SCNC: 4.9 MMOL/L (ref 3.4–5.3)
PSA SERPL-MCNC: 4.8 UG/L (ref 0–4)
SODIUM SERPL-SCNC: 139 MMOL/L (ref 133–144)
TRIGL SERPL-MCNC: 226 MG/DL

## 2022-01-04 PROCEDURE — 86769 SARS-COV-2 COVID-19 ANTIBODY: CPT | Mod: 90 | Performed by: PHYSICIAN ASSISTANT

## 2022-01-04 PROCEDURE — 80061 LIPID PANEL: CPT | Performed by: PHYSICIAN ASSISTANT

## 2022-01-04 PROCEDURE — 36415 COLL VENOUS BLD VENIPUNCTURE: CPT | Performed by: PHYSICIAN ASSISTANT

## 2022-01-04 PROCEDURE — 80048 BASIC METABOLIC PNL TOTAL CA: CPT | Performed by: PHYSICIAN ASSISTANT

## 2022-01-04 PROCEDURE — 99397 PER PM REEVAL EST PAT 65+ YR: CPT | Performed by: PHYSICIAN ASSISTANT

## 2022-01-04 PROCEDURE — G0103 PSA SCREENING: HCPCS | Performed by: PHYSICIAN ASSISTANT

## 2022-01-04 PROCEDURE — 99000 SPECIMEN HANDLING OFFICE-LAB: CPT | Performed by: PHYSICIAN ASSISTANT

## 2022-01-04 PROCEDURE — 99213 OFFICE O/P EST LOW 20 MIN: CPT | Mod: 25 | Performed by: PHYSICIAN ASSISTANT

## 2022-01-04 RX ORDER — AMLODIPINE BESYLATE 5 MG/1
5 TABLET ORAL DAILY
Qty: 30 TABLET | Refills: 0 | Status: CANCELLED | OUTPATIENT
Start: 2022-01-04

## 2022-01-04 RX ORDER — PRAVASTATIN SODIUM 10 MG
10 TABLET ORAL DAILY
Qty: 30 TABLET | Refills: 0 | Status: SHIPPED | OUTPATIENT
Start: 2022-01-04 | End: 2022-01-28

## 2022-01-04 RX ORDER — AMLODIPINE BESYLATE 10 MG/1
10 TABLET ORAL DAILY
Qty: 90 TABLET | Refills: 0 | Status: SHIPPED | OUTPATIENT
Start: 2022-01-04 | End: 2023-01-06

## 2022-01-04 ASSESSMENT — ENCOUNTER SYMPTOMS
HEMATOCHEZIA: 0
NAUSEA: 0
COUGH: 0
PALPITATIONS: 0
NERVOUS/ANXIOUS: 0
SORE THROAT: 0
ARTHRALGIAS: 1
MYALGIAS: 1
CHILLS: 0
HEMATURIA: 0
FREQUENCY: 0
SHORTNESS OF BREATH: 0
ABDOMINAL PAIN: 0
DIZZINESS: 0
DYSURIA: 0
FEVER: 0
PARESTHESIAS: 0
EYE PAIN: 0
HEARTBURN: 0
HEADACHES: 0
JOINT SWELLING: 0
CONSTIPATION: 0
WEAKNESS: 0
DIARRHEA: 0

## 2022-01-04 ASSESSMENT — ACTIVITIES OF DAILY LIVING (ADL): CURRENT_FUNCTION: NO ASSISTANCE NEEDED

## 2022-01-04 ASSESSMENT — MIFFLIN-ST. JEOR: SCORE: 1808.13

## 2022-01-04 NOTE — PROGRESS NOTES
"SUBJECTIVE:   Vikash Taylor is a 71 year old male who presents for Preventive Visit.      Patient has been advised of split billing requirements and indicates understanding: Yes   Are you in the first 12 months of your Medicare coverage?  No    Healthy Habits:     In general, how would you rate your overall health?  Good    Frequency of exercise:  4-5 days/week    Duration of exercise:  Greater than 60 minutes    Do you usually eat at least 4 servings of fruit and vegetables a day, include whole grains    & fiber and avoid regularly eating high fat or \"junk\" foods?  Yes    Taking medications regularly:  Yes    Medication side effects:  Lightheadedness    Ability to successfully perform activities of daily living:  No assistance needed    Home Safety:  No safety concerns identified    Hearing Impairment:  Difficulty following a conversation in a noisy restaurant or crowded room, feel that people are mumbling or not speaking clearly, difficulty following dialogue in the theater, difficult to understand a speaker at a public meeting or [employer] service, need to ask people to speak up or repeat themselves, find that men's voices are easier to understand than woman's, difficulty understanding soft or whispered speech and difficulty understanding speech on the telephone    In the past 6 months, have you been bothered by leaking of urine?  No    In general, how would you rate your overall mental or emotional health?  Excellent      PHQ-2 Total Score: 0    Additional concerns today:  No    Do you feel safe in your environment? Yes    Have you ever done Advance Care Planning? (For example, a Health Directive, POLST, or a discussion with a medical provider or your loved ones about your wishes): Yes, advance care planning is on file.       Fall risk  Fallen 2 or more times in the past year?: No  Any fall with injury in the past year?: No    Cognitive Screening   1) Repeat 3 items (Leader, Season, Table)    2) Clock draw: " NORMAL  3) 3 item recall: Recalls 3 objects  Results: 3 items recalled: COGNITIVE IMPAIRMENT LESS LIKELY    Mini-CogTM Copyright S Rena. Licensed by the author for use in [facility]; reprinted with permission (torsten@[email or web address]). All rights reserved.        Reviewed and updated as needed this visit by clinical staff  Tobacco  Allergies  Meds  Problems  Med Hx  Surg Hx  Fam Hx  Soc Hx         Reviewed and updated as needed this visit by Provider  Tobacco  Allergies  Meds  Problems  Med Hx  Surg Hx  Fam Hx        Social History     Tobacco Use     Smoking status: Former Smoker     Packs/day: 1.00     Years: 35.00     Pack years: 35.00     Types: Cigarettes, Cigars, Dip, chew, snus or snuff     Start date: 6/15/1965     Quit date: 2015     Years since quittin.6     Smokeless tobacco: Former User     Quit date: 2015   Substance Use Topics     Alcohol use: Yes     Alcohol/week: 0.0 standard drinks     Comment: occ         Alcohol Use 2022   Prescreen: >3 drinks/day or >7 drinks/week? No   Prescreen: >3 drinks/day or >7 drinks/week? -       Hyperlipidemia Follow-Up      Are you regularly taking any medication or supplement to lower your cholesterol?   No    Are you having muscle aches or other side effects that you think could be caused by your cholesterol lowering medication?  No  Didn't like how lipitor made him feel so stopped it.     Hypertension Follow-up      Do you check your blood pressure regularly outside of the clinic? Yes     Are you following a low salt diet? Yes    Are your blood pressures ever more than 140 on the top number (systolic) OR more   than 90 on the bottom number (diastolic), for example 140/90? No      Current providers sharing in care for this patient include:   Patient Care Team:  Joss Neumann PA-C as PCP - General (Family Practice)  Joss Neumann PA-C as Assigned PCP    The following health maintenance items are reviewed in Epic and  correct as of today:  Health Maintenance Due   Topic Date Due     ANNUAL REVIEW OF HM ORDERS  Never done     LUNG CANCER SCREENING  Never done     FALL RISK ASSESSMENT  2019     BMP  2020     LIPID  2020     ZOSTER IMMUNIZATION (3 of 3) 2020     INFLUENZA VACCINE (1) 2021     COVID-19 Vaccine (3 - Booster for Moderna series) 10/05/2021     Lab work is in process  Labs reviewed in EPIC  BP Readings from Last 3 Encounters:   22 (!) 150/90   20 (!) 170/89   20 (!) 140/84    Wt Readings from Last 3 Encounters:   22 102.5 kg (226 lb)   20 104.3 kg (230 lb)   19 100.2 kg (221 lb)             Patient Active Problem List   Diagnosis     Hypertriglyceridemia     Dyslipidemia     HYPERLIPIDEMIA LDL GOAL <160     Advanced directives, counseling/discussion     Shingles     Obesity     Wears hearing aid     Essential hypertension, benign     Heart murmur     SOB (shortness of breath)     Bilateral edema of lower extremity     Hypertension goal BP (blood pressure) < 150/90     Retinal detachment, unspecified laterality     Bilateral low back pain with left-sided sciatica, unspecified chronicity     Impaired fasting glucose     Cataract of right eye, unspecified cataract type     Lyme disease     Past Surgical History:   Procedure Laterality Date     ARTHROSCOPY KNEE RT/LT      ACL     COLONOSCOPY  2013    Procedure: COLONOSCOPY;  COLONSCOPY-SCREENING;  Surgeon: Vikash Brown MD;  Location: [facility] OR     LITHOTRIPSY      Lithotrypsy     SURGICAL HISTORY OF -       toe fusion     TONSILLECTOMY         Social History     Tobacco Use     Smoking status: Former Smoker     Packs/day: 1.00     Years: 35.00     Pack years: 35.00     Types: Cigarettes, Cigars, Dip, chew, snus or snuff     Start date: 6/15/1965     Quit date: 2015     Years since quittin.6     Smokeless tobacco: Former User     Quit date: 2015   Substance Use Topics     Alcohol use: Yes      Alcohol/week: 0.0 standard drinks     Comment: occ     Family History   Problem Relation Age of Onset     Diabetes Mother      Hypertension Mother      Cerebrovascular Disease Mother      Heart Disease Father         chf     Diabetes Father      Coronary Artery Disease Father      Hypertension Father      Cerebrovascular Disease Father      Other Cancer Brother          Current Outpatient Medications   Medication Sig Dispense Refill     amLODIPine (NORVASC) 10 MG tablet Take 1 tablet (10 mg) by mouth daily 90 tablet 0     amLODIPine (NORVASC) 5 MG tablet TAKE 1 TABLET BY MOUTH EVERY DAY 30 tablet 0     pravastatin (PRAVACHOL) 10 MG tablet Take 1 tablet (10 mg) by mouth daily 30 tablet 0     Allergies   Allergen Reactions     Nkda [No Known Drug Allergies]      Recent Labs   Lab Test 03/17/21  0000 03/12/19  0734 11/08/18  1321 07/12/18  0952 05/23/18  0845 11/07/17  0938 03/29/17  0924   LDL  --  169*  --   --  141*  --  107*   HDL  --  52  --   --  45  --  63   TRIG  --  156*  --   --  152*  --  126   ALT  --  33 28  --   --   --  41   CR 1.0 1.09 1.15   < > 0.99   < > 0.98   GFRESTIMATED  --  68 63   < > 74   < > 75   GFRESTBLACK  --  79 76   < > >90   < > >90  African American GFR Calc     POTASSIUM 4.6 4.6 4.9   < > 4.7   < > 4.5   TSH  --  0.44 0.14*  --   --   --   --     < > = values in this interval not displayed.            Review of Systems   Constitutional: Negative for chills and fever.   HENT: Positive for congestion, ear pain and hearing loss. Negative for sore throat.    Eyes: Negative for pain and visual disturbance.   Respiratory: Negative for cough and shortness of breath.    Cardiovascular: Negative for chest pain, palpitations and peripheral edema.   Gastrointestinal: Negative for abdominal pain, constipation, diarrhea, heartburn, hematochezia and nausea.   Genitourinary: Negative for dysuria, frequency, genital sores, hematuria, impotence, penile discharge and urgency.   Musculoskeletal:  Positive for arthralgias and myalgias. Negative for joint swelling.   Skin: Negative for rash.   Neurological: Negative for dizziness, weakness, headaches and paresthesias.   Psychiatric/Behavioral: Negative for mood changes. The patient is not nervous/anxious.      OBJECTIVE:   BP (!) 150/90   Pulse 68   Temp 97.7  F (36.5  C) (Tympanic)   Resp 16   Ht 1.829 m (6')   Wt 102.5 kg (226 lb)   SpO2 96%   BMI 30.65 kg/m   Estimated body mass index is 30.65 kg/m  as calculated from the following:    Height as of this encounter: 1.829 m (6').    Weight as of this encounter: 102.5 kg (226 lb).  Physical Exam  GENERAL: healthy, alert and no distress  EYES: Eyes grossly normal to inspection, PERRL and conjunctivae and sclerae normal  HENT: ear canals and TM's normal, nose and mouth without ulcers or lesions  NECK: no adenopathy, no asymmetry, masses, or scars and thyroid normal to palpation  RESP: lungs clear to auscultation - no rales, rhonchi or wheezes  CV: regular rate and rhythm, normal S1 S2, no S3 or S4, no murmur, click or rub, no peripheral edema and peripheral pulses strong  ABDOMEN: soft, nontender, no hepatosplenomegaly, no masses and bowel sounds normal   (male): deferred  MS: no gross musculoskeletal defects noted, no edema  SKIN: no suspicious lesions or rashes  NEURO: Normal strength and tone, mentation intact and speech normal  PSYCH: mentation appears normal, affect normal/bright    Diagnostic Test Results:  Labs reviewed in Epic    ASSESSMENT / PLAN:       ICD-10-CM    1. Encounter for Medicare annual wellness exam  Z00.00    2. Hypertension goal BP (blood pressure) < 150/90  I10 amLODIPine (NORVASC) 10 MG tablet     Basic metabolic panel  (Ca, Cl, CO2, Creat, Gluc, K, Na, BUN)     OFFICE/OUTPT VISIT,EST,LEVL III   3. Dyslipidemia  E78.5 pravastatin (PRAVACHOL) 10 MG tablet     Lipid panel reflex to direct LDL Fasting     OFFICE/OUTPT VISIT,EST,LEVL III   4. COVID-19 ruled out  Z20.822 COVID-19  Thong RBD Brianne & Titer Reflex     COVID-19 Thong RBD Brianne & Titer Reflex   5. Screening PSA (prostate specific antigen)  Z12.5 PSA, screen   1. Work on Healthy diet and exercise. Getting heart rate elevated for 30 mins most days of week.  2. Increase amlodipine to 10mg daily. Recheck blood pressure in 2 wks.   3. Start pravastain to see if he tolerates this better.     Patient has been advised of split billing requirements and indicates understanding: Yes  COUNSELING:  Reviewed preventive health counseling, as reflected in patient instructions       Regular exercise       Healthy diet/nutrition       Vision screening       Prostate cancer screening    Estimated body mass index is 30.65 kg/m  as calculated from the following:    Height as of this encounter: 1.829 m (6').    Weight as of this encounter: 102.5 kg (226 lb).    Weight management plan: Discussed healthy diet and exercise guidelines    He reports that he quit smoking about 6 years ago. His smoking use included cigarettes, cigars, and dip, chew, snus or snuff. He started smoking about 56 years ago. He has a 35.00 pack-year smoking history. He quit smokeless tobacco use about 7 years ago.      Appropriate preventive services were discussed with this patient, including applicable screening as appropriate for cardiovascular disease, diabetes, osteopenia/osteoporosis, and glaucoma.  As appropriate for age/gender, discussed screening for colorectal cancer, prostate cancer, breast cancer, and cervical cancer. Checklist reviewing preventive services available has been given to the patient.    Reviewed patients plan of care and provided an AVS. The Basic Care Plan (routine screening as documented in Health Maintenance) for Vikash meets the Care Plan requirement. This Care Plan has been established and reviewed with the Patient.    Counseling Resources:  ATP IV Guidelines  Pooled Cohorts Equation Calculator  Breast Cancer Risk Calculator  Breast Cancer: Medication to  Reduce Risk  FRAX Risk Assessment  ICSI Preventive Guidelines  Dietary Guidelines for Americans, 2010  USDA's MyPlate  ASA Prophylaxis  Lung CA Screening    LESLIE Hand [facility]    Identified Health Risks:

## 2022-01-04 NOTE — PATIENT INSTRUCTIONS
Patient Education   Personalized Prevention Plan  You are due for the preventive services outlined below.  Your care team is available to assist you in scheduling these services.  If you have already completed any of these items, please share that information with your care team to update in your medical record.  Health Maintenance Due   Topic Date Due     ANNUAL REVIEW OF HM ORDERS  Never done     LUNG CANCER SCREENING  Never done     Annual Wellness Visit  06/08/2019     FALL RISK ASSESSMENT  06/08/2019     Basic Metabolic Panel  03/12/2020     Cholesterol Lab  03/12/2020     Zoster (Shingles) Vaccine (3 of 3) 04/24/2020     Flu Vaccine (1) 09/01/2021     COVID-19 Vaccine (3 - Booster for Moderna series) 10/05/2021

## 2022-01-05 LAB
SARS-COV-2 AB SERPL IA-ACNC: 29 U/ML
SARS-COV-2 AB SERPL QL IA: POSITIVE

## 2022-01-17 ENCOUNTER — LAB (OUTPATIENT)
Dept: LAB | Facility: CLINIC | Age: 72
End: 2022-01-17
Payer: COMMERCIAL

## 2022-01-17 DIAGNOSIS — Z20.822 ENCOUNTER FOR LABORATORY TESTING FOR COVID-19 VIRUS: ICD-10-CM

## 2022-01-17 PROCEDURE — U0003 INFECTIOUS AGENT DETECTION BY NUCLEIC ACID (DNA OR RNA); SEVERE ACUTE RESPIRATORY SYNDROME CORONAVIRUS 2 (SARS-COV-2) (CORONAVIRUS DISEASE [COVID-19]), AMPLIFIED PROBE TECHNIQUE, MAKING USE OF HIGH THROUGHPUT TECHNOLOGIES AS DESCRIBED BY CMS-2020-01-R: HCPCS | Mod: 90

## 2022-01-17 PROCEDURE — 99000 SPECIMEN HANDLING OFFICE-LAB: CPT

## 2022-01-17 PROCEDURE — U0005 INFEC AGEN DETEC AMPLI PROBE: HCPCS | Mod: 90

## 2022-01-18 LAB — SARS-COV-2 RNA RESP QL NAA+PROBE: DETECTED

## 2022-01-19 ENCOUNTER — TELEPHONE (OUTPATIENT)
Dept: LAB | Facility: CLINIC | Age: 72
End: 2022-01-19
Payer: COMMERCIAL

## 2022-01-19 NOTE — TELEPHONE ENCOUNTER
"Coronavirus (COVID-19) Notification    Pt mentioned that he currently is out of [place] right now, in touch / health [facility], waiting on hearing back about where he will isolate and if he can fly back home to [place] in a few more days. He is aware of isolation precautions as far as what he is to be doing during isolation. He stated that at this moment he feels better than ever, and that he is asx. No further questions as pt had to end call due to incoming call he needed to take.     Caller Name (Patient, parent, daughter/son, grandparent, etc)  Pt    Reason for call  Notify of Positive Coronavirus (COVID-19) lab results, assess symptoms,  review [facility] recommendations    Lab Result    Lab test:  2019-nCoV rRt-PCR or SARS-CoV-2 PCR    Oropharyngeal AND/OR nasopharyngeal swabs is POSITIVE for 2019-nCoV RNA/SARS-COV-2 PCR (COVID-19 virus)    RN Recommendations/Instructions per [facility] Coronavirus COVID-19 recommendations    Brief introduction script  Introduce self then review script:  \"I am calling on behalf of [employer].  We were notified that your Coronavirus test (COVID-19) for was POSITIVE for the virus.  I have some information to relay to you but first I wanted to mention that the [place] Dept of [facility] will be contacting you shortly [it's possible MD already called Patient] to talk to you more about how you are feeling and other people you have had contact with who might now also have the virus.  Also, [facility] is Partnering with the [facility] for Covid-19 research, you may be contacted directly by research staff.\"      Assessment (Inquire about Patient's current symptoms)   Assessment   Current Symptoms at time of phone call: (if no symptoms, document No symptoms] none   Symptoms onset (if applicable) n/a     If at time of call, Patients symptoms hare worsened, the Patient should contact 911 or have someone drive them to Emergency Dept promptly:      If Patient calling " 911, inform 911 personal that you have tested positive for the Coronavirus (COVID-19).  Place mask on and await 911 to arrive.    If Emergency Dept, If possible, please have another adult drive you to the Emergency Dept but you need to wear mask when in contact with other people.          Treatment Options:   Patient classified as COVID treatment eligible by Epic high risk algorithm: Yes  Is the patient symptomatic at the time of result notification? No.      Review information with Patient    Your result was positive. This means you have COVID-19 (coronavirus).  We have sent you a letter that reviews the information that I'll be reviewing with you now.    During this time:    Stay in your own room, including for meals. Use your own bathroom if you can.    Stay away from others in your home. No hugging, kissing or shaking hands. No visitors.     Don't go to work, school or anywhere else.     You should not go back to work until you meet the guidelines above for ending your home isolation. You don't need to be retested for COVID-19 before going back to work--studies show that you won't spread the virus if it's been at least 10 days since your symptoms started (or 20 days, if you have a weak immune system).    Employers: This document serves as formal notice of your employee's medical guidelines for going back to work. They must meet the above guidelines before going back to work in person.    How can I take care of myself?    1. Get lots of rest. Drink extra fluids (unless a doctor has told you not to).    2. Take Tylenol (acetaminophen) for fever or pain. If you have liver or kidney problems, ask your family doctor if it's okay to take Tylenol.     Take either:     650 mg (two 325 mg pills) every 4 to 6 hours, or     1,000 mg (two 500 mg pills) every 8 hours as needed.     Note: Don't take more than 3,000 mg in one day. Acetaminophen is found in many medicines (both prescribed and over-the-counter medicines). Read  all labels to be sure you don't take too much.    For children, check the Tylenol bottle for the right dose (based on their age or weight).    3. If you have other health problems (like cancer, heart failure, an organ transplant or severe kidney disease): Call your specialty clinic if you don't feel better in the next 2 days.    4. Know when to call 911: Emergency warning signs include:    Trouble breathing or shortness of breath    Pain or pressure in the chest that doesn't go away    Feeling confused like you haven't felt before, or not being able to wake up    Bluish-colored lips or face    5. Sign up for [employer]. We know it's scary to hear that you have COVID-19. We want to track your symptoms to make sure you're okay over the next 2 weeks. Please look for an email from [employer]--this is a free, online program that we'll use to keep in touch. To sign up, follow the link in the email. Learn more at [email or web address].    Where can I get more information?    [facility]: [email or web address]    Coronavirus Basics: www.health.[email or web address].[place]/diseases/coronavirus/basics.html    What to Do If You're Sick: www.cdc.gov/coronavirus/2019-ncov/about/steps-when-sick.html    Ending Home Isolation: www.cdc.gov/coronavirus/2019-ncov/hcp/disposition-in-home-patients.html     Caring for Someone with COVID-19: www.cdc.gov/coronavirus/2019-ncov/if-you-are-sick/care-for-someone.html     [facility] clinical trials (COVID-19 research studies): [email or web address]     A Positive COVID-19 letter will be sent via [employer] or the mail. (Exception, no letters sent to Presurgerical/Preprocedure Patients)    Miesha Still

## 2022-01-28 DIAGNOSIS — E78.5 DYSLIPIDEMIA: ICD-10-CM

## 2022-01-28 RX ORDER — PRAVASTATIN SODIUM 10 MG
10 TABLET ORAL DAILY
Qty: 90 TABLET | Refills: 3 | Status: SHIPPED | OUTPATIENT
Start: 2022-01-28 | End: 2023-02-08

## 2022-02-01 ENCOUNTER — ALLIED HEALTH/NURSE VISIT (OUTPATIENT)
Dept: FAMILY MEDICINE | Facility: CLINIC | Age: 72
End: 2022-02-01
Payer: COMMERCIAL

## 2022-02-01 VITALS — SYSTOLIC BLOOD PRESSURE: 138 MMHG | DIASTOLIC BLOOD PRESSURE: 78 MMHG

## 2022-02-01 DIAGNOSIS — I10 HYPERTENSION GOAL BP (BLOOD PRESSURE) < 150/90: Primary | ICD-10-CM

## 2022-02-01 PROCEDURE — 99207 PR NO CHARGE NURSE ONLY: CPT

## 2022-02-01 NOTE — PROGRESS NOTES
I met with Vikash Taylor at the request of Joss Neumann  to recheck his blood pressure.  Blood pressure medications on the med list were reviewed with patient.    Patient has taken all medications as per usual regimen: Yes  Patient reports tolerating them without any issues or concerns: Yes    Vitals:    02/01/22 1033   BP: 138/78       Blood pressure was taken, previous encounter was reviewed, recorded blood pressure below 140/90.  Patient was discharged and the note will be sent to the provider for final review.      Venus Gaines MA

## 2022-02-17 PROBLEM — Z71.89 ADVANCED DIRECTIVES, COUNSELING/DISCUSSION: Status: ACTIVE | Noted: 2018-06-08

## 2022-02-18 ENCOUNTER — TRANSFERRED RECORDS (OUTPATIENT)
Dept: HEALTH INFORMATION MANAGEMENT | Facility: CLINIC | Age: 72
End: 2022-02-18
Payer: COMMERCIAL

## 2022-02-28 ENCOUNTER — MYC MEDICAL ADVICE (OUTPATIENT)
Dept: FAMILY MEDICINE | Facility: CLINIC | Age: 72
End: 2022-02-28
Payer: COMMERCIAL

## 2022-02-28 DIAGNOSIS — R97.20 ELEVATED PROSTATE SPECIFIC ANTIGEN (PSA): Primary | ICD-10-CM

## 2022-05-04 ENCOUNTER — MYC MEDICAL ADVICE (OUTPATIENT)
Dept: FAMILY MEDICINE | Facility: CLINIC | Age: 72
End: 2022-05-04
Payer: COMMERCIAL

## 2022-05-05 ENCOUNTER — OFFICE VISIT (OUTPATIENT)
Dept: UROLOGY | Facility: CLINIC | Age: 72
End: 2022-05-05
Attending: PHYSICIAN ASSISTANT
Payer: COMMERCIAL

## 2022-05-05 DIAGNOSIS — R97.20 ELEVATED PROSTATE SPECIFIC ANTIGEN (PSA): Primary | ICD-10-CM

## 2022-05-05 PROCEDURE — 99204 OFFICE O/P NEW MOD 45 MIN: CPT | Performed by: UROLOGY

## 2022-05-05 NOTE — PROGRESS NOTES
Urology Consult History and Physical  LELA GLYNN   Name: Vikash Taylor    MRN: [id number]   YOB: 1950       We were asked to see Vikash Taylor at the request of Joss MACK for evaluation and treatment of Elevated PSA .        Chief Complaint:   Elevated PSA     History is obtained from the patient and his wife            History of Present Illness:   Vikash Taylor is a 72 year old male who is being seen for evaluation of Elevated PSA     No other issues  No LUTS  Remote history of kidney stones 20 years which required ESWL  He was noted in the past to have testicular atrophy and work-up and noted to have low testosterone though this is been asymptomatic for him and he is not ever had testosterone replacement    No family history of prostate cancer              Past Medical History:     Past Medical History:   Diagnosis Date     Calculi, renal      Essential hypertension, benign 2016     Heart murmur 2016     Hypertension goal BP (blood pressure) < 150/90 5/3/2017     Hypogonadism male 2010     Lyme disease 2018     Shingles      Testicular atrophy             Past Surgical History:     Past Surgical History:   Procedure Laterality Date     ARTHROSCOPY KNEE RT/LT      ACL     COLONOSCOPY  2013    Procedure: COLONOSCOPY;  COLONSCOPY-SCREENING;  Surgeon: Vikash Brown MD;  Location: [facility]     LITHOTRIPSY      Lithotrypsy     SURGICAL HISTORY OF -       toe fusion     TONSILLECTOMY              Social History:     Social History     Tobacco Use     Smoking status: Former Smoker     Packs/day: 1.00     Years: 35.00     Pack years: 35.00     Types: Cigarettes, Cigars, Dip, chew, snus or snuff     Start date: 6/15/1965     Quit date: 2015     Years since quittin.0     Smokeless tobacco: Former User     Quit date: 2015   Substance Use Topics     Alcohol use: Yes     Alcohol/week: 0.0 standard drinks     Comment: occ       History   Smoking  Status     Former Smoker     Packs/day: 1.00     Years: 35.00     Types: Cigarettes, Cigars, Dip, chew, snus or snuff     Start date: 6/15/1965     Quit date: 5/1/2015   Smokeless Tobacco     Former User     Quit date: 1/1/2015            Family History:     Family History   Problem Relation Age of Onset     Diabetes Mother      Hypertension Mother      Cerebrovascular Disease Mother      Heart Disease Father         chf     Diabetes Father      Coronary Artery Disease Father      Hypertension Father      Cerebrovascular Disease Father      Other Cancer Brother               Allergies:     Allergies   Allergen Reactions     Nkda [No Known Drug Allergies]             Medications:     Current Outpatient Medications   Medication Sig     amLODIPine (NORVASC) 10 MG tablet Take 1 tablet (10 mg) by mouth daily     amLODIPine (NORVASC) 5 MG tablet TAKE 1 TABLET BY MOUTH EVERY DAY     pravastatin (PRAVACHOL) 10 MG tablet TAKE 1 TABLET (10 MG) BY MOUTH DAILY. (Patient not taking: Reported on 5/5/2022)     No current facility-administered medications for this visit.             Review of Systems:     Skin: negative  Eyes: negative  Ears/Nose/Throat: negative  Respiratory: No shortness of breath, dyspnea on exertion, cough, or hemoptysis  Cardiovascular: negative  Gastrointestinal: negative  Genitourinary: as above  Musculoskeletal: negative  Neurologic: negative  Psychiatric: negative  Hematologic/Lymphatic/Immunologic: negative  Endocrine: negative          Physical Exam:   No data found.  There is no height or weight on file to calculate BMI.     General: age-appropriate appearing male in NAD  HEENT: Head AT/NC, EOMI, CN Grossly intact  Lungs: no respiratory distress, or pursed lip breathing  Heart: No obvious jugular venous distension present  Back: no bony midline tenderness, no CVAT bilaterally.  Abdomen: soft, non-distended, non-tender. No organomegaly  Lymph: no palpable inguinal lymphadenopathy.  LE: no edema.    Musculoskeltal: extremities normal, no peripheral edema  Skin: no suspicious lesions or rashes  Neuro: Alert, oriented, speech and mentation normal;  moving all 4 extremities equally.  Psych: affect and mood normal          Data:   All laboratory data reviewed:      PSA   Date Value Ref Range Status   04/21/2016 4.14 (H) 0 - 4 ug/L Final     Prostate Specific Antigen Screen   Date Value Ref Range Status   01/04/2022 4.80 (H) 0.00 - 4.00 ug/L Final   02/18/2022                  6.91       Lab Results   Component Value Date    CR 1.10 01/04/2022    CR 1.0 03/17/2021               Impression and Plan:   Impression:   72-year-old man with elevated PSA      Plan:   Elevated PSA  - We discussed the use of PSA as a screening test for prostate cancer  - I reviewed his PSA history and trend  - We discussed that his most recent PSA from February is concerning and demonstrates a significant change from prior  - We discussed the rationale for proceeding with a prostate MRI.  We discussed that this has become the clinical standard of care and that there is growing body of evidence to help support this practice as it has been shown to increase the diagnostic accuracy of an initial biopsy and decrease the rate of repeat biopsies  - Order for MRI placed  - Virtual visit follow-up to discuss the results  - This is an undiagnosed new problem with an uncertain prognosis  - I also reviewed his serum creatinine which is within normal limits     Thank you for the kind consultation.    Time spent: 20 minutes spent on the date of the encounter doing chart review, history and exam, documentation and further activities as noted above.    Rohan Blair MD   Urology  [facility]  [facility] Phone: [phone]  [facility] Phone: [phone]

## 2022-05-05 NOTE — NURSING NOTE
Vikash Taylor's goals for this visit include:   Chief Complaint   Patient presents with     New Patient     Elevated PSA 4.80       He requests these members of his care team be copied on today's visit information:     PCP: Joss Neumann    Referring Provider:  Joss Neumann PA-C  [address] [place] [address]    There were no vitals taken for this visit.    Do you need any medication refills at today's visit?     Johana Eid LPN on 5/5/2022 at 1:33 PM

## 2022-07-05 ENCOUNTER — ANCILLARY PROCEDURE (OUTPATIENT)
Dept: MRI IMAGING | Facility: CLINIC | Age: 72
End: 2022-07-05
Attending: UROLOGY
Payer: COMMERCIAL

## 2022-07-05 PROCEDURE — 72197 MRI PELVIS W/O & W/DYE: CPT | Mod: GC | Performed by: STUDENT IN AN ORGANIZED HEALTH CARE EDUCATION/TRAINING PROGRAM

## 2022-07-05 PROCEDURE — A9585 GADOBUTROL INJECTION: HCPCS | Performed by: STUDENT IN AN ORGANIZED HEALTH CARE EDUCATION/TRAINING PROGRAM

## 2022-07-05 RX ORDER — GADOBUTROL 604.72 MG/ML
10 INJECTION INTRAVENOUS ONCE
Status: COMPLETED | OUTPATIENT
Start: 2022-07-05 | End: 2022-07-05

## 2022-07-05 RX ADMIN — GADOBUTROL 10 ML: 604.72 INJECTION INTRAVENOUS at 16:27

## 2022-07-11 ENCOUNTER — TELEPHONE (OUTPATIENT)
Dept: UROLOGY | Facility: CLINIC | Age: 72
End: 2022-07-11

## 2022-07-11 NOTE — TELEPHONE ENCOUNTER
.[facility] Call Center    Phone Message    May a detailed message be left on voicemail: yes     Reason for Call: Other: Pt called to reschedule appt with Dr Blair as 7/12 will not work for pt. Writer rescheduled to 8/18 as this was first available.  Pt is asking if its possible to do a telephone visit with Dr Blair as video visits do not work on his computer.    Pt also asks if a copy of the MRI can be sent to Dr Con Arboleda at the [facility]?  Please give pt a call to discuss      Action Taken: Message routed to:  Other: URO    Travel Screening: Not Applicable

## 2022-07-12 NOTE — TELEPHONE ENCOUNTER
Called patient left generic voicemail.    When resident calls back please relay that Kar Blair does not do telephone visit, we can switch to in person.     Also if he wants images sent to another facility he will need to give that facility this number to push images there [phone].     Jennifer Luna LPN on 7/12/2022 at 10:40 AM

## 2022-07-22 NOTE — TELEPHONE ENCOUNTER
Spoke to patient and switched appointment to in person.     Jennifer Luna LPN on 7/22/2022 at 9:11 AM

## 2022-08-18 ENCOUNTER — OFFICE VISIT (OUTPATIENT)
Dept: UROLOGY | Facility: CLINIC | Age: 72
End: 2022-08-18
Payer: COMMERCIAL

## 2022-08-18 DIAGNOSIS — R97.20 ELEVATED PROSTATE SPECIFIC ANTIGEN (PSA): Primary | ICD-10-CM

## 2022-08-18 PROCEDURE — 99214 OFFICE O/P EST MOD 30 MIN: CPT | Performed by: UROLOGY

## 2022-08-18 RX ORDER — CIPROFLOXACIN 500 MG/1
500 TABLET, FILM COATED ORAL 2 TIMES DAILY
Qty: 6 TABLET | Refills: 0 | Status: SHIPPED | OUTPATIENT
Start: 2022-08-18 | End: 2023-01-06

## 2022-08-18 NOTE — PROGRESS NOTES
Vikash Taylor's goals for this visit include:   Chief Complaint   Patient presents with     RECHECK     Follow-up to discuss MRI results       He requests these members of his care team be copied on today's visit information:       PCP: Joss Neumann    Referring Provider:  Referred Self, MD  No address on file    There were no vitals taken for this visit.    Do you need any medication refills at today's visit?     Jennifer Luna LPN on 8/18/2022 at 3:19 PM

## 2022-08-18 NOTE — PROGRESS NOTES
[facility]   CHIEF COMPLAINT   It was my pleasure to see Vikash Taylor who is a 72 year old male for follow-up of Elevated PSA .      HPI   Vikash Taylor is a very pleasant 72 year old male     Initially seen 5/5/2022:  Vikash Taylor is a 72 year old male who is being seen for evaluation of Elevated PSA      No other issues  No LUTS  Remote history of kidney stones 20 years which required ESWL  He was noted in the past to have testicular atrophy and work-up and noted to have low testosterone though this is been asymptomatic for him and he is not ever had testosterone replacement     No family history of prostate cancer     TODAY 8/18/2022:  Follow-up today to review his prostate MRI results  He did well with the MRI with no issues  His wife joins him for this visit    PHYSICAL EXAM  Patient is a 72 year old  male   Vitals: There were no vitals taken for this visit.  There is no height or weight on file to calculate BMI.  General Appearance Adult:   Alert, no acute distress, oriented  HENT: throat/mouth:normal, good dentition  Lungs: no respiratory distress, or pursed lip breathing  Heart: No obvious jugular venous distension present  Abdomen: soft, nontender, no organomegaly or masses  Musculoskeltal: extremities normal, no peripheral edema  Skin: no suspicious lesions or rashes  Neuro: Alert, oriented, speech and mentation normal  Psych: affect and mood normal  Gait: Normal    PSA   Date Value Ref Range Status   04/21/2016 4.14 (H) 0 - 4 ug/L Final     Prostate Specific Antigen Screen   Date Value Ref Range Status   01/04/2022 4.80 (H) 0.00 - 4.00 ug/L Final    02/18/2022                  6.91     Creatinine   Date Value Ref Range Status   01/04/2022 1.10 0.66 - 1.25 mg/dL Final   03/17/2021 1.0 0.7 - 1.2 mg/dL Final      IMAGING:  All pertinent imaging reviewed:    All imaging studies reviewed by me.  I personally reviewed these imaging films.  A formal report from radiology will follow.    MRI  PROSTATE 7/5/2022:  FINDINGS:     Size: 58 grams, 5.4 x 4.2 x 4.9 cm .  Hemorrhage: Absent  Peripheral zone: Heterogeneous on T2-weighted images. Suspicious  lesions as detailed below.  Transition zone: Enlarged with BPH changes. Suspicious lesions as  detailed below.     Lesion(s) in rank order of severity (highest score- to lowest score,  then by size)      Lesion 1:     Location: Right apex peripheral zone at the 10 o'clock position  relative to the urethra. Series 7 image 69, Series 14, image 22.   Additional prostate regions involved: None   Size: 7 mm  T2 description: Circumscribed and homogeneously hypointense  T2 numerical assessment: 4  DWI description: Focal, marked hyperintensity on high B value DWI.  DWI numerical assessment: 4  DCE assessment: Positive    Prostate margin: Capsular abutment<6 mm    Lesion overall PI-RADS category: 4     Lesion 2:     Location: Left apex and mid gland peripheral zone at the 4 o'clock  position relative to the urethra. Series 14 image 22 and series 7,  image 68.   Additional prostate regions involved: None   Size: 19 mm  T2 description: Ill marginated T2 hypointensity  T2 numerical assessment: 3  DWI description: focal mild signal hyperintensity on high B value DWI  DWI numerical assessment: 3  DCE assessment: Positive    Prostate margin: Capsular abutment>15 mm with smooth contour and focal  bulging along the most lateral aspect (3:00 position) series 7, image  70   Lesion overall PI-RADS category: 4        Lesion 3:  Location: Left mid gland transition zone at the 1-2 o'clock position  relative to the urethra. Series  14 image 19. Series 7, image 59  Additional prostate regions involved: None   Size: 7 mm  T2 description:  encapsulated homogeneously hypointense nodule  T2 numerical assessment: 2  DWI description: Focal marked increased signal on DWI and and marked  hypointensity on ADC  DWI numerical assessment: 4  DCE assessment: Negative    Prostate margin: No capsular  abutment   Lesion overall PI-RADS category: 3     Neurovascular bundles: The left neurovascular bundle is abutted by  suspicious observation.    Seminal vesicles: No seminal vesicle involvement by malignancy.   Lymph nodes: No lymph node involvement  Bones: No suspicious lesions   Other pelvic organs: Colonic diverticulosis without evidence for acute  diverticulitis in the partly visualized colon.                                                          IMPRESSION:     1. Based on the most suspicious abnormality, this exam is  characterized as PIRADS 4 - Clinically significant cancer is likely to  be present.  The most suspicious abnormality is located at the right  apex peripheral zone at the 10:00 position and there is minimal  capsular abutment with no convincing evidence of extraprostatic  extension.  2. Additional PIRADS 4 - Clinically significant cancer is likely to be  present.  abnormality is located at the left apex and mid gland  peripheral zone at the 4-5:00 position and there is capsular bulging  indicating moderately high suspicion of minimal extraprostatic  extension.  3. Additional PIRADS 3 - The presence of clinically significant cancer  is equivocal.  abnormality is located at the left mid gland  transitional zone at 1:00 position and there is no evidence of  extraprostatic extension.  4. No suspicious adenopathy or evidence of pelvic metastases.    ASSESSMENT and PLAN  72-year-old man with elevated PSA    Elevated PSA  - Again reviewed his PSA history and trend  - I reviewed his MRI and reviewed these images personally.  I agree with radiologist interpretation.  We discussed the PI-RADS scoring system and that PI-RADS 4 lesions will correlate with clinically significant prostate cancer about 80% of the time while PI-RADS 3 lesions will correlate about 50% of the time  - We discussed the need to proceed with an MR fusion UroNav biopsy  -TRUS/Bx - we discussed the risks and benefits of the prostate  biopsy including the normal intermittent hematuria, blood per rectum, and blood with ejaculation as well as a 1-2% risk of post biopsy sepsis requiring hospitalization and IV antibiotics  -Prescription for ciprofloxacin sent to his pharmacy  - This is an undiagnosed new problem with an uncertain prognosis      Time spent: 20 minutes spent on the date of the encounter doing chart review, history and exam, documentation and further activities as noted above.    Rohan Blair MD   Urology  [facility]  [facility] Phone: [phone]  [facility] Phone: [phone]

## 2022-08-18 NOTE — Clinical Note
Hi Mr. Neumann,  I saw Vikash and his wife back today to discuss his MRI results.  This does show some concerning PI-RADS 4 and 3 lesions which will require an MR fusion UroNav biopsy.  We will work to get this scheduled in the next several weeks and we will keep you updated with the biopsy results.  Thanks,  Rohan Blair M.D. Cell: [phone]

## 2022-08-29 ENCOUNTER — OFFICE VISIT (OUTPATIENT)
Dept: UROLOGY | Facility: CLINIC | Age: 72
End: 2022-08-29
Payer: COMMERCIAL

## 2022-08-29 VITALS
OXYGEN SATURATION: 96 % | SYSTOLIC BLOOD PRESSURE: 138 MMHG | WEIGHT: 225 LBS | HEIGHT: 72 IN | BODY MASS INDEX: 30.48 KG/M2 | HEART RATE: 61 BPM | DIASTOLIC BLOOD PRESSURE: 70 MMHG

## 2022-08-29 DIAGNOSIS — R97.20 ELEVATED PROSTATE SPECIFIC ANTIGEN (PSA): ICD-10-CM

## 2022-08-29 DIAGNOSIS — Z79.2 PROPHYLACTIC ANTIBIOTIC: Primary | ICD-10-CM

## 2022-08-29 PROCEDURE — 88344 IMHCHEM/IMCYTCHM EA MLT ANTB: CPT | Performed by: PATHOLOGY

## 2022-08-29 PROCEDURE — 76942 ECHO GUIDE FOR BIOPSY: CPT | Performed by: UROLOGY

## 2022-08-29 PROCEDURE — 96372 THER/PROPH/DIAG INJ SC/IM: CPT | Mod: 59 | Performed by: UROLOGY

## 2022-08-29 PROCEDURE — 55700 PR BIOPSY OF PROSTATE,NEEDLE/PUNCH: CPT | Performed by: UROLOGY

## 2022-08-29 PROCEDURE — G0416 PROSTATE BIOPSY, ANY MTHD: HCPCS | Performed by: PATHOLOGY

## 2022-08-29 RX ORDER — LIDOCAINE HYDROCHLORIDE 10 MG/ML
20 INJECTION, SOLUTION EPIDURAL; INFILTRATION; INTRACAUDAL; PERINEURAL ONCE
Status: CANCELLED | OUTPATIENT
Start: 2022-08-29 | End: 2022-08-29

## 2022-08-29 RX ORDER — GENTAMICIN 40 MG/ML
80 INJECTION, SOLUTION INTRAMUSCULAR; INTRAVENOUS ONCE
Status: COMPLETED | OUTPATIENT
Start: 2022-08-29 | End: 2022-08-29

## 2022-08-29 RX ORDER — LIDOCAINE HYDROCHLORIDE 10 MG/ML
20 INJECTION, SOLUTION EPIDURAL; INFILTRATION; INTRACAUDAL; PERINEURAL ONCE
Status: COMPLETED | OUTPATIENT
Start: 2022-08-29 | End: 2022-08-29

## 2022-08-29 RX ADMIN — GENTAMICIN 80 MG: 40 INJECTION, SOLUTION INTRAMUSCULAR; INTRAVENOUS at 09:30

## 2022-08-29 RX ADMIN — LIDOCAINE HYDROCHLORIDE 15 ML: 10 INJECTION, SOLUTION EPIDURAL; INFILTRATION; INTRACAUDAL; PERINEURAL at 11:37

## 2022-08-29 ASSESSMENT — PAIN SCALES - GENERAL: PAINLEVEL: NO PAIN (0)

## 2022-08-29 NOTE — LETTER
8/29/2022       RE: Vikash Taylor  [address] [facility] [address]     Dear Colleague,    Thank you for referring your patient, Vikash Taylor, to the [facility] [place] at [facility]. Please see a copy of my visit note below.    PHYSICIANS NOTE: TRANSRECTAL ULTRASOUND AND BIOPSY PROCEDURE: 8/29/2022   NEGRITA     Sign In   History and Physical Exam reviewed and is unchanged.     Primary Diagnosis: Elevated psa (primary encounter diagnosis)   Prostate cancer     Informed Consent Discussed: Yes. Risks, benefits, alternatives and personnel discussed with patient who consents to proceed.     Sign in Communication: Completed   Time Out: Team Confirms the Correct Patient,   Correct Procedure; Transrectal Ultrasound and Biopsy, Correct Site and Site Marking (not applicable), Correct Position.   Affirmation of Time Out: YES   Sign Out: Sign Out Discussion: Completed   Patient history and ROS confirmed unchanged from last office visit.   Family history for prostate cancer is: unknown   Audible Time Out: Yes     TRUS PROCEDURE WITH BIOPSY - MR FUSION URO-MARCO    The patient was placed in the lateral decubitus position.     Digital rectal exam was normal.     The ultrasound probe was placed into the rectum and the prostate visualized.   Approximately five cc plain lidocaine was injected bilaterally into the perioprostatic nerves.     The prostate was visualized in both planes and 3 hypoechoic lesions identified corresponding to the MRI targets.     The total prostate volume was 62.3 gm     The patient underwent biopsy removing 20 total cores. 3 cores from each of the PIRADs 4 lesions and 2 cores from the PIRADs 3 lesion, 12 standard template cores    PSA   Date Value Ref Range Status   04/21/2016 4.14 (H) 0 - 4 ug/L Final     Prostate Specific Antigen Screen   Date Value Ref Range Status   01/04/2022 4.80 (H) 0.00 - 4.00 ug/L Final        ----------     Complications: None     Follow-up: He is on MyChart and would like to receive results before our scheduled visit. Follow up as scheduled.    Rohan Blair MD

## 2022-08-29 NOTE — PROGRESS NOTES
PHYSICIANS NOTE: TRANSRECTAL ULTRASOUND AND BIOPSY PROCEDURE: 8/29/2022   NEGRITA     Sign In   History and Physical Exam reviewed and is unchanged.     Primary Diagnosis: Elevated psa (primary encounter diagnosis)   Prostate cancer     Informed Consent Discussed: Yes. Risks, benefits, alternatives and personnel discussed with patient who consents to proceed.     Sign in Communication: Completed   Time Out: Team Confirms the Correct Patient,   Correct Procedure; Transrectal Ultrasound and Biopsy, Correct Site and Site Marking (not applicable), Correct Position.   Affirmation of Time Out: YES   Sign Out: Sign Out Discussion: Completed   Patient history and ROS confirmed unchanged from last office visit.   Family history for prostate cancer is: unknown   Audible Time Out: Yes     TRUS PROCEDURE WITH BIOPSY - MR FUSION URO-MARCO    The patient was placed in the lateral decubitus position.     Digital rectal exam was normal.     The ultrasound probe was placed into the rectum and the prostate visualized.   Approximately five cc plain lidocaine was injected bilaterally into the perioprostatic nerves.     The prostate was visualized in both planes and 3 hypoechoic lesions identified corresponding to the MRI targets.     The total prostate volume was 62.3 gm     The patient underwent biopsy removing 20 total cores. 3 cores from each of the PIRADs 4 lesions and 2 cores from the PIRADs 3 lesion, 12 standard template cores    PSA   Date Value Ref Range Status   04/21/2016 4.14 (H) 0 - 4 ug/L Final     Prostate Specific Antigen Screen   Date Value Ref Range Status   01/04/2022 4.80 (H) 0.00 - 4.00 ug/L Final       ----------     Complications: None     Follow-up: He is on MyChart and would like to receive results before our scheduled visit. Follow up as scheduled.    Rohan Blair MD

## 2022-08-29 NOTE — PATIENT INSTRUCTIONS
[facility]rology  Transrectal Ultrasound  Post Operative Information    The physician who performed your Transrectal Ultrasound is Dr. Blair (telephone number [phone]).  Please contact this doctor if you have any problems or questions.  If unable to reach your doctor, please return to the Emergency Department.    Take one antibiotic the evening of the procedure and then as directed on your prescription.  Drink at least 6-8 glasses of fluids for the first 48 hours.  Avoid heavy lifting and strenuous activity for 48 hours.  Avoid sexual intercourse for the first 24 hours.  No aspirin or ibuprofen products (Motrin, Advil, Nuprin, ect.) for one week.  You may take acetaminophen (Tylenol) for pain.  You may notice a small amount of blood on the tissue after a bowel movement.  You may pass blood with clots in your urine following the procedure.  The amount will decrease with time but may be visible for up to two weeks.   You make have blood in your semen for 4 weeks after the procedure.  You may experience mild perineal (groin area) discomfort after the procedure.  Please call you doctor if you have any of the follow symptoms:  Fever  Increase in the amount of blood passed  Severe discomfort or pain

## 2022-08-29 NOTE — NURSING NOTE
Chief Complaint   Patient presents with     Elevated PSA     Patient here today for in office MRI Transrectal Ultrasound Fusion-Guided Prostate Biopsy         Procedure was explained to patient prior to performing said procedure. The patient signed the consent form and all questions were answered prior to   the procedure. Any pre-procedural antibiotics were given according to the performing physicians recommendation. Pt's information was confirmed on samples and samples were sent for analysis. Paient reviewed information on labels sent with patient and confirmed the accuracy of all the labels.    Consent read and signed: Yes     Allergies   Allergen Reactions     Nkda [No Known Drug Allergies]      Performing Physician:   Antibiotic taken? Yes  Aspirin or other blood thinning medications discontinued 7-10 days:  YES  Time of Fleet's enema: No Patient state he has taken fiber Wise for 3 days  Patient given Gentamicin intramuscular injection 30 minutes prior to procedure Yes    12 samples were taken from the right and left, medial and lateral base, mid, and apex of the prostate respectively. YES additional samples were taken . Vitals were repeated prior to patient leaving and instructions for post MRI Transrectal Ultrasound Fusion-Guided Prostate Biopsy care were explained to the patient.

## 2022-08-31 LAB
PATH REPORT.COMMENTS IMP SPEC: NORMAL
PATH REPORT.FINAL DX SPEC: NORMAL
PATH REPORT.GROSS SPEC: NORMAL
PATH REPORT.MICROSCOPIC SPEC OTHER STN: NORMAL
PATH REPORT.RELEVANT HX SPEC: NORMAL
PHOTO IMAGE: NORMAL

## 2022-09-06 ENCOUNTER — VIRTUAL VISIT (OUTPATIENT)
Dept: UROLOGY | Facility: CLINIC | Age: 72
End: 2022-09-06
Payer: COMMERCIAL

## 2022-09-06 DIAGNOSIS — R97.20 ELEVATED PROSTATE SPECIFIC ANTIGEN (PSA): Primary | ICD-10-CM

## 2022-09-06 PROCEDURE — 99213 OFFICE O/P EST LOW 20 MIN: CPT | Mod: 95 | Performed by: UROLOGY

## 2022-09-06 NOTE — PROGRESS NOTES
[facility]   CHIEF COMPLAINT   It was my pleasure to see Vikash Taylor who is a 72 year old male for follow-up of Elevated PSA .      HPI   Vikash Taylor is a very pleasant 72 year old male     Initially seen 5/5/2022:  Vikash Taylor is a 72 year old male who is being seen for evaluation of Elevated PSA      No other issues  No LUTS  Remote history of kidney stones 20 years which required ESWL  He was noted in the past to have testicular atrophy and work-up and noted to have low testosterone though this is been asymptomatic for him and he is not ever had testosterone replacement     No family history of prostate cancer     8/18/2022:  Follow-up today to review his prostate MRI results  He did well with the MRI with no issues  His wife joins him for this visit    TODAY 9/6/2022:  He did well after the biopsy   Gross hematuria resolved after a few days     Shared a funny joke about a chameleon    PHYSICAL EXAM  Patient is a 72 year old  male   Vitals: There were no vitals taken for this visit.  There is no height or weight on file to calculate BMI.  General Appearance Adult:   Alert, no acute distress, oriented  HENT: throat/mouth:normal, good dentition  Lungs: no respiratory distress, or pursed lip breathing  Heart: No obvious jugular venous distension present  Abdomen: non - distended  Musculoskeltal: extremities normal, no peripheral edema  Skin: no suspicious lesions or rashes  Neuro: Alert, oriented, speech and mentation normal  Psych: affect and mood normal  Gait: Normal     PSA   Date Value Ref Range Status   04/21/2016 4.14 (H) 0 - 4 ug/L Final     Prostate Specific Antigen Screen   Date Value Ref Range Status   01/04/2022 4.80 (H) 0.00 - 4.00 ug/L Final    02/18/2022                  6.91     Creatinine   Date Value Ref Range Status   01/04/2022 1.10 0.66 - 1.25 mg/dL Final   03/17/2021 1.0 0.7 - 1.2 mg/dL Final      PATHOLOGY:  MR-FUSION URONAV 8/29/2022:  Final Diagnosis   A.  Prostate, left  mid LESION x2, biopsy-  Benign prostate tissue    B.  Prostate, left apex LESION x3, biopsy-  Benign prostate tissue    C.  Prostate, right apex LESION x3, biopsy-  Benign prostate tissue    D. - O.:  Prostate, TEMPLATE biopsy-  Benign prostate tissue        IMAGING:  All pertinent imaging reviewed:    All imaging studies reviewed by me.  I personally reviewed these imaging films.  A formal report from radiology will follow.    MRI PROSTATE 7/5/2022:  FINDINGS:     Size: 58 grams, 5.4 x 4.2 x 4.9 cm .  Hemorrhage: Absent  Peripheral zone: Heterogeneous on T2-weighted images. Suspicious  lesions as detailed below.  Transition zone: Enlarged with BPH changes. Suspicious lesions as  detailed below.     Lesion(s) in rank order of severity (highest score- to lowest score,  then by size)      Lesion 1:  Location: Right apex peripheral zone at the 10 o'clock position  relative to the urethra. Series 7 image 69, Series 14, image 22.   Additional prostate regions involved: None   Size: 7 mm  T2 description: Circumscribed and homogeneously hypointense  T2 numerical assessment: 4  DWI description: Focal, marked hyperintensity on high B value DWI.  DWI numerical assessment: 4  DCE assessment: Positive    Prostate margin: Capsular abutment<6 mm    Lesion overall PI-RADS category: 4     Lesion 2:  Location: Left apex and mid gland peripheral zone at the 4 o'clock  position relative to the urethra. Series 14 image 22 and series 7,  image 68.   Additional prostate regions involved: None   Size: 19 mm  T2 description: Ill marginated T2 hypointensity  T2 numerical assessment: 3  DWI description: focal mild signal hyperintensity on high B value DWI  DWI numerical assessment: 3  DCE assessment: Positive    Prostate margin: Capsular abutment>15 mm with smooth contour and focal  bulging along the most lateral aspect (3:00 position) series 7, image  70   Lesion overall PI-RADS category: 4     Lesion 3:  Location: Left mid gland transition  zone at the 1-2 o'clock position  relative to the urethra. Series  14 image 19. Series 7, image 59  Additional prostate regions involved: None   Size: 7 mm  T2 description:  encapsulated homogeneously hypointense nodule  T2 numerical assessment: 2  DWI description: Focal marked increased signal on DWI and and marked  hypointensity on ADC  DWI numerical assessment: 4  DCE assessment: Negative    Prostate margin: No capsular abutment   Lesion overall PI-RADS category: 3     Neurovascular bundles: The left neurovascular bundle is abutted by  suspicious observation.    Seminal vesicles: No seminal vesicle involvement by malignancy.   Lymph nodes: No lymph node involvement  Bones: No suspicious lesions   Other pelvic organs: Colonic diverticulosis without evidence for acute  diverticulitis in the partly visualized colon.                                                          IMPRESSION:  1. Based on the most suspicious abnormality, this exam is  characterized as PIRADS 4 - Clinically significant cancer is likely to  be present.  The most suspicious abnormality is located at the right  apex peripheral zone at the 10:00 position and there is minimal  capsular abutment with no convincing evidence of extraprostatic  extension.  2. Additional PIRADS 4 - Clinically significant cancer is likely to be  present.  abnormality is located at the left apex and mid gland  peripheral zone at the 4-5:00 position and there is capsular bulging  indicating moderately high suspicion of minimal extraprostatic  extension.  3. Additional PIRADS 3 - The presence of clinically significant cancer  is equivocal.  abnormality is located at the left mid gland  transitional zone at 1:00 position and there is no evidence of  extraprostatic extension.  4. No suspicious adenopathy or evidence of pelvic metastases.    ASSESSMENT and PLAN  72-year-old man with elevated PSA    Elevated PSA  - Again reviewed his PSA history and trend  - I reviewed his MRI  and reviewed these images personally.  I agree with radiologist interpretation.  We discussed the PI-RADS scoring system and that PI-RADS 4 lesions will correlate with clinically significant prostate cancer about 80% of the time while PI-RADS 3 lesions will correlate about 50% of the time  -I reviewed his prostate biopsy results which were reassuring with no evidence of prostate cancer from any of the target lesions as well as the template biopsy  - Given that this was a well targeted biopsy I find this very reassuring  - Follow-up in 6 months with a repeat PSA      Time spent: 15 minutes spent on the date of the encounter doing chart review, history and exam, documentation and further activities as noted above.    Rohan Blair MD   Urology  [facility]  [facility] Phone: [phone]  [facility] Phone: [phone]      Vikash Taylor  who is being evaluated via a billable video visit.      How would you like to obtain your AVS? MyChart  If the video visit is dropped, the invitation should be resent by: Text to cell phone: [phone]  Will anyone else be joining your video visit? No    Video-Visit Details    Type of service:  Video Visit    Video Start Time: 10:00 AM    Video End Time:10:15 AM    Originating Location (pt. Location): Home    Distant Location (provider location):  [facility]     Platform used for Video Visit: eBrisk Video

## 2023-02-01 ASSESSMENT — ENCOUNTER SYMPTOMS
HEMATOCHEZIA: 0
MYALGIAS: 0
COUGH: 0
CHILLS: 0
FEVER: 0
EYE PAIN: 0
ABDOMINAL PAIN: 0
PALPITATIONS: 0
ARTHRALGIAS: 1
DIARRHEA: 0
WEAKNESS: 0
NAUSEA: 0
NERVOUS/ANXIOUS: 0
CONSTIPATION: 0
DIZZINESS: 0
PARESTHESIAS: 0
SORE THROAT: 0
SHORTNESS OF BREATH: 0
DYSURIA: 0
HEADACHES: 0
FREQUENCY: 0
HEARTBURN: 0
JOINT SWELLING: 0
HEMATURIA: 0

## 2023-02-01 ASSESSMENT — ACTIVITIES OF DAILY LIVING (ADL): CURRENT_FUNCTION: NO ASSISTANCE NEEDED

## 2023-02-08 ENCOUNTER — OFFICE VISIT (OUTPATIENT)
Dept: FAMILY MEDICINE | Facility: CLINIC | Age: 73
End: 2023-02-08
Payer: COMMERCIAL

## 2023-02-08 VITALS
OXYGEN SATURATION: 95 % | DIASTOLIC BLOOD PRESSURE: 86 MMHG | TEMPERATURE: 97.6 F | HEART RATE: 66 BPM | HEIGHT: 72 IN | WEIGHT: 216 LBS | RESPIRATION RATE: 14 BRPM | SYSTOLIC BLOOD PRESSURE: 147 MMHG | BODY MASS INDEX: 29.26 KG/M2

## 2023-02-08 DIAGNOSIS — E78.5 DYSLIPIDEMIA: ICD-10-CM

## 2023-02-08 DIAGNOSIS — I10 HYPERTENSION GOAL BP (BLOOD PRESSURE) < 150/90: ICD-10-CM

## 2023-02-08 DIAGNOSIS — R97.20 ELEVATED PROSTATE SPECIFIC ANTIGEN (PSA): ICD-10-CM

## 2023-02-08 DIAGNOSIS — R01.1 HEART MURMUR: ICD-10-CM

## 2023-02-08 DIAGNOSIS — Z86.19 HISTORY OF LYME DISEASE: ICD-10-CM

## 2023-02-08 DIAGNOSIS — Z00.00 MEDICARE ANNUAL WELLNESS VISIT, SUBSEQUENT: Primary | ICD-10-CM

## 2023-02-08 LAB
ALBUMIN SERPL-MCNC: 3.6 G/DL (ref 3.4–5)
ALP SERPL-CCNC: 63 U/L (ref 40–150)
ALT SERPL W P-5'-P-CCNC: 24 U/L (ref 0–70)
ANION GAP SERPL CALCULATED.3IONS-SCNC: 4 MMOL/L (ref 3–14)
AST SERPL W P-5'-P-CCNC: 16 U/L (ref 0–45)
BILIRUB SERPL-MCNC: 0.6 MG/DL (ref 0.2–1.3)
BUN SERPL-MCNC: 20 MG/DL (ref 7–30)
CALCIUM SERPL-MCNC: 9.1 MG/DL (ref 8.5–10.1)
CHLORIDE BLD-SCNC: 107 MMOL/L (ref 94–109)
CHOLEST SERPL-MCNC: 238 MG/DL
CO2 SERPL-SCNC: 29 MMOL/L (ref 20–32)
CREAT SERPL-MCNC: 1.18 MG/DL (ref 0.66–1.25)
FASTING STATUS PATIENT QL REPORTED: NO
GFR SERPL CREATININE-BSD FRML MDRD: 65 ML/MIN/1.73M2
GLUCOSE BLD-MCNC: 86 MG/DL (ref 70–99)
HDLC SERPL-MCNC: 55 MG/DL
LDLC SERPL CALC-MCNC: 150 MG/DL
NONHDLC SERPL-MCNC: 183 MG/DL
POTASSIUM BLD-SCNC: 4.1 MMOL/L (ref 3.4–5.3)
PROT SERPL-MCNC: 6.9 G/DL (ref 6.8–8.8)
SODIUM SERPL-SCNC: 140 MMOL/L (ref 133–144)
TRIGL SERPL-MCNC: 164 MG/DL

## 2023-02-08 PROCEDURE — 99213 OFFICE O/P EST LOW 20 MIN: CPT | Mod: 25 | Performed by: PHYSICIAN ASSISTANT

## 2023-02-08 PROCEDURE — G0439 PPPS, SUBSEQ VISIT: HCPCS | Performed by: PHYSICIAN ASSISTANT

## 2023-02-08 PROCEDURE — 36415 COLL VENOUS BLD VENIPUNCTURE: CPT | Performed by: PHYSICIAN ASSISTANT

## 2023-02-08 PROCEDURE — 80061 LIPID PANEL: CPT | Performed by: PHYSICIAN ASSISTANT

## 2023-02-08 PROCEDURE — 84153 ASSAY OF PSA TOTAL: CPT | Performed by: PHYSICIAN ASSISTANT

## 2023-02-08 PROCEDURE — 80053 COMPREHEN METABOLIC PANEL: CPT | Performed by: PHYSICIAN ASSISTANT

## 2023-02-08 RX ORDER — DOXYCYCLINE 100 MG/1
100 CAPSULE ORAL 2 TIMES DAILY
Qty: 20 CAPSULE | Refills: 1 | Status: SHIPPED | OUTPATIENT
Start: 2023-02-08 | End: 2023-02-18

## 2023-02-08 RX ORDER — AMLODIPINE BESYLATE 10 MG/1
10 TABLET ORAL DAILY
Qty: 90 TABLET | Refills: 1 | Status: SHIPPED | OUTPATIENT
Start: 2023-02-08

## 2023-02-08 ASSESSMENT — ENCOUNTER SYMPTOMS
NAUSEA: 0
FEVER: 0
SORE THROAT: 0
FREQUENCY: 0
EYE PAIN: 0
CHILLS: 0
WEAKNESS: 0
DIZZINESS: 0
DYSURIA: 0
HEMATOCHEZIA: 0
HEMATURIA: 0
MYALGIAS: 0
SHORTNESS OF BREATH: 0
HEARTBURN: 0
DIARRHEA: 0
HEADACHES: 0
CONSTIPATION: 0
ARTHRALGIAS: 1
COUGH: 0
ABDOMINAL PAIN: 0
NERVOUS/ANXIOUS: 0
PALPITATIONS: 0
PARESTHESIAS: 0
JOINT SWELLING: 0

## 2023-02-08 ASSESSMENT — PAIN SCALES - GENERAL: PAINLEVEL: NO PAIN (0)

## 2023-02-08 ASSESSMENT — ACTIVITIES OF DAILY LIVING (ADL): CURRENT_FUNCTION: NO ASSISTANCE NEEDED

## 2023-02-08 NOTE — PROGRESS NOTES
"SUBJECTIVE:   CC: Vikash is an 72 year old who presents for preventative health visit.   {Split Bill scripting  The purpose of this visit is to discuss your medical history and prevent health problems before you are sick. You may be responsible for a co-pay, coinsurance, or deductible if your visit today includes services such as checking on a sore throat, having an x-ray or lab test, or treating and evaluating a new or existing condition :107882}  {Patient advised of split billing (Optional):115038}  Healthy Habits:     In general, how would you rate your overall health?  Excellent    Frequency of exercise:  2-3 days/week    Duration of exercise:  30-45 minutes    Do you usually eat at least 4 servings of fruit and vegetables a day, include whole grains    & fiber and avoid regularly eating high fat or \"junk\" foods?  No    Taking medications regularly:  Yes    Medication side effects:  Other    Ability to successfully perform activities of daily living:  No assistance needed    Home Safety:  No safety concerns identified    Hearing Impairment:  Difficulty following a conversation in a noisy restaurant or crowded room, feel that people are mumbling or not speaking clearly, difficulty following dialogue in the theater, difficult to understand a speaker at a public meeting or Spiritism service, need to ask people to speak up or repeat themselves, find that men's voices are easier to understand than woman's, difficulty understanding soft or whispered speech and difficulty understanding speech on the telephone    In the past 6 months, have you been bothered by leaking of urine?  No    In general, how would you rate your overall mental or emotional health?  Excellent      PHQ-2 Total Score: 0    Additional concerns today:  No    {Add if <65 person on Medicare  - Required Questions (Optional):635431}  {Outside tests to abstract? :982149}    {additional problems to add (Optional):643337}    Today's PHQ-2 Score:   PHQ-2 " "(  [employer]) 2023   Q1: Little interest or pleasure in doing things 0   Q2: Feeling down, depressed or hopeless 0   PHQ-2 Score 0   Q1: Little interest or pleasure in doing things Not at all   Q2: Feeling down, depressed or hopeless Not at all   PHQ-2 Score 0           Social History     Tobacco Use     Smoking status: Former     Packs/day: 1.00     Years: 35.00     Pack years: 35.00     Types: Cigarettes, Cigars, Dip, chew, snus or snuff     Start date: 6/15/1965     Quit date: 2015     Years since quittin.7     Smokeless tobacco: Former     Quit date: 2015   Substance Use Topics     Alcohol use: Yes     Alcohol/week: 0.0 standard drinks     Comment: occ     {Rooming Staff- Complete this question if Prescreen response is not shown below for today's visit. If you drink alcohol do you typically have >3 drinks per day or >7 drinks per week? (Optional):657161}    Alcohol Use 2023   Prescreen: >3 drinks/day or >7 drinks/week? Yes   Prescreen: >3 drinks/day or >7 drinks/week? -   {add AUDIT responses (Optional) (A score of 7 for adult men is an indication of hazardous drinking; a score of 8 or more is an indication of an alcohol use disorder.  A score of 7 or more for adult women is an indication of hazardous drinking or an alchohol use disorder):531657}    Last PSA:   PSA   Date Value Ref Range Status   2016 4.14 (H) 0 - 4 ug/L Final     Prostate Specific Antigen Screen   Date Value Ref Range Status   2022 4.80 (H) 0.00 - 4.00 ug/L Final       Reviewed orders with patient. Reviewed health maintenance and updated orders accordingly - { :474430::\"Yes\"}  {Chronicprobdata (optional):063756}    Reviewed and updated as needed this visit by clinical staff                  Reviewed and updated as needed this visit by Provider                 {HISTORY OPTIONS (Optional):723120}    Review of Systems   Constitutional: Negative for chills and fever.   HENT: Positive for congestion and hearing loss. " "Negative for ear pain and sore throat.    Eyes: Negative for pain and visual disturbance.   Respiratory: Negative for cough and shortness of breath.    Cardiovascular: Negative for chest pain, palpitations and peripheral edema.   Gastrointestinal: Negative for abdominal pain, constipation, diarrhea, heartburn, hematochezia and nausea.   Genitourinary: Negative for dysuria, frequency, genital sores, hematuria, impotence, penile discharge and urgency.   Musculoskeletal: Positive for arthralgias. Negative for joint swelling and myalgias.   Skin: Negative for rash.   Neurological: Negative for dizziness, weakness, headaches and paresthesias.   Psychiatric/Behavioral: Negative for mood changes. The patient is not nervous/anxious.      {MALE ROS (Optional):258141::\"CONSTITUTIONAL: NEGATIVE for fever, chills, change in weight\",\"INTEGUMENTARY/SKIN: NEGATIVE for worrisome rashes, moles or lesions\",\"EYES: NEGATIVE for vision changes or irritation\",\"ENT: NEGATIVE for ear, mouth and throat problems\",\"RESP: NEGATIVE for significant cough or SOB\",\"CV: NEGATIVE for chest pain, palpitations or peripheral edema\",\"GI: NEGATIVE for nausea, abdominal pain, heartburn, or change in bowel habits\",\" male: negative for dysuria, hematuria, decreased urinary stream, erectile dysfunction, urethral discharge\",\"MUSCULOSKELETAL: NEGATIVE for significant arthralgias or myalgia\",\"NEURO: NEGATIVE for weakness, dizziness or paresthesias\",\"PSYCHIATRIC: NEGATIVE for changes in mood or affect\"}    OBJECTIVE:   There were no vitals taken for this visit.    Physical Exam  {Exam Choices (Optional):027834}    {Diagnostic Test Results (Optional):673333::\"Diagnostic Test Results:\",\"Labs reviewed in Epic\"}    ASSESSMENT/PLAN:   {Diag Picklist:382815}    {Patient advised of split billing (Optional):437741}      COUNSELING:   {MALE COUNSELING MESSAGES:170810::\"Reviewed preventive health counseling, as reflected in patient instructions\"}      BMI:   Estimated " body mass index is 30.52 kg/m  as calculated from the following:    Height as of 8/29/22: 1.829 m (6').    Weight as of 8/29/22: 102.1 kg (225 lb).   {Weight Management Plan needed for ACO:049842}      He reports that he quit smoking about 7 years ago. His smoking use included cigarettes, cigars, and dip, chew, snus or snuff. He started smoking about 57 years ago. He has a 35.00 pack-year smoking history. He quit smokeless tobacco use about 8 years ago.      {Counseling Resources  [place] Preventive Services Task Force  Cholesterol Screening  Health diet/nutrition  Pooled Cohorts Equation Calculator  USDA's MyPlate  ASA Prophylaxis  Lung CA Screening  Osteoporosis prevention/bone health :976498}  {Prostate Cancer Screening  Consider for men 55-69 per guidance from USPSTF :380850}    LESLIE Hand [facility]

## 2023-02-08 NOTE — PROGRESS NOTES
"SUBJECTIVE:   Vikash is a 72 year old who presents for Preventive Visit.    Patient has been advised of split billing requirements and indicates understanding: Yes  Are you in the first 12 months of your Medicare coverage?  No    Healthy Habits:     In general, how would you rate your overall health?  Excellent    Frequency of exercise:  2-3 days/week    Duration of exercise:  30-45 minutes    Do you usually eat at least 4 servings of fruit and vegetables a day, include whole grains    & fiber and avoid regularly eating high fat or \"junk\" foods?  No    Taking medications regularly:  Yes    Medication side effects:  Other    Ability to successfully perform activities of daily living:  No assistance needed    Home Safety:  No safety concerns identified    Hearing Impairment:  Difficulty following a conversation in a noisy restaurant or crowded room, feel that people are mumbling or not speaking clearly, difficulty following dialogue in the theater, difficult to understand a speaker at a public meeting or Denominational service, need to ask people to speak up or repeat themselves, find that men's voices are easier to understand than woman's, difficulty understanding soft or whispered speech and difficulty understanding speech on the telephone    In the past 6 months, have you been bothered by leaking of urine?  No    In general, how would you rate your overall mental or emotional health?  Excellent      PHQ-2 Total Score: 0    Additional concerns today:  No      Have you ever done Advance Care Planning? (For example, a Health Directive, POLST, or a discussion with a medical provider or your loved ones about your wishes): Yes, advance care planning is on file.       Fall risk  Fallen 2 or more times in the past year?: No  Any fall with injury in the past year?: No    Cognitive Screening no impairment on exam today.         Reviewed and updated as needed this visit by clinical staff   Tobacco  Allergies  Meds  Problems  " Med Hx  Surg Hx  Fam Hx          Reviewed and updated as needed this visit by Provider   Tobacco  Allergies  Meds  Problems  Med Hx  Surg Hx  Fam Hx         Social History     Tobacco Use     Smoking status: Former     Packs/day: 1.00     Years: 35.00     Pack years: 35.00     Types: Cigarettes, Cigars, Dip, chew, snus or snuff     Start date: 6/15/1965     Quit date: 2015     Years since quittin.7     Smokeless tobacco: Former     Quit date: 2015   Substance Use Topics     Alcohol use: Yes     Alcohol/week: 0.0 standard drinks     Comment: occ         Alcohol Use 2023   Prescreen: >3 drinks/day or >7 drinks/week? Yes   Prescreen: >3 drinks/day or >7 drinks/week? -         Hyperlipidemia Follow-Up      Are you regularly taking any medication or supplement to lower your cholesterol?   No    Are you having muscle aches or other side effects that you think could be caused by your cholesterol lowering medication?  Yes- myalgias. so stopped medications.     Hypertension Follow-up      Do you check your blood pressure regularly outside of the clinic? Yes     Are you following a low salt diet? Yes    Are your blood pressures ever more than 140 on the top number (systolic) OR more   than 90 on the bottom number (diastolic), for example 140/90? No  Hasn't taken his medication today.    echo 2016: Mild aortic regurgitation. No chest pain or short of breath.     Also in 2018 he was diagnosed with Lyme disease and use doxycycline and helped his symptoms.  Once or twice a year he will have similar symptoms and has used doxycycline and that helps his symptoms go away.  He would like a refill of.    Current providers sharing in care for this patient include:   Patient Care Team:  Joss Neumann PA-C as PCP - General (Family Practice)  Joss Neumann PA-C as Assigned PCP  Rohan Blair MD as Assigned Surgical Provider    The following health maintenance items are reviewed in Epic and  correct as of today:  Health Maintenance   Topic Date Due     ANNUAL REVIEW OF HM ORDERS  Never done     LUNG CANCER SCREENING  Never done     COVID-19 Vaccine (3 - Booster for Moderna series) 05/31/2021     INFLUENZA VACCINE (1) 09/01/2022     MEDICARE ANNUAL WELLNESS VISIT  01/04/2023     BMP  01/04/2023     LIPID  01/04/2023     COLORECTAL CANCER SCREENING  06/06/2023     FALL RISK ASSESSMENT  02/08/2024     ADVANCE CARE PLANNING  02/08/2028     DTAP/TDAP/TD IMMUNIZATION (3 - Td or Tdap) 05/02/2032     HEPATITIS C SCREENING  Completed     PHQ-2 (once per calendar year)  Completed     Pneumococcal Vaccine: 65+ Years  Completed     ZOSTER IMMUNIZATION  Completed     AORTIC ANEURYSM SCREENING (SYSTEM ASSIGNED)  Completed     IPV IMMUNIZATION  Aged Out     MENINGITIS IMMUNIZATION  Aged Out     Lab work is in process  Labs reviewed in EPIC  BP Readings from Last 3 Encounters:   02/08/23 (!) 147/86   08/29/22 138/70   02/01/22 138/78    Wt Readings from Last 3 Encounters:   02/08/23 98 kg (216 lb)   08/29/22 102.1 kg (225 lb)   01/04/22 102.5 kg (226 lb)                  Patient Active Problem List   Diagnosis     Hypertriglyceridemia     Dyslipidemia     HYPERLIPIDEMIA LDL GOAL <160     Advanced directives, counseling/discussion     Shingles     Obesity     Wears hearing aid     Essential hypertension, benign     Heart murmur     SOB (shortness of breath)     Bilateral edema of lower extremity     Hypertension goal BP (blood pressure) < 150/90     Retinal detachment, unspecified laterality     Bilateral low back pain with left-sided sciatica, unspecified chronicity     Impaired fasting glucose     Cataract of right eye, unspecified cataract type     Lyme disease     History of Lyme disease     Past Surgical History:   Procedure Laterality Date     ARTHROSCOPY KNEE RT/LT      ACL     COLONOSCOPY  6/6/2013    Procedure: COLONOSCOPY;  COLONSCOPY-SCREENING;  Surgeon: Vikash Brown MD;  Location:  OR      LITHOTRIPSY      Lithotrypsy     SURGICAL HISTORY OF -       toe fusion     TONSILLECTOMY         Social History     Tobacco Use     Smoking status: Former     Packs/day: 1.00     Years: 35.00     Pack years: 35.00     Types: Cigarettes, Cigars, Dip, chew, snus or snuff     Start date: 6/15/1965     Quit date: 2015     Years since quittin.7     Smokeless tobacco: Former     Quit date: 2015   Substance Use Topics     Alcohol use: Yes     Alcohol/week: 0.0 standard drinks     Comment: occ     Family History   Problem Relation Age of Onset     Diabetes Mother      Hypertension Mother      Cerebrovascular Disease Mother      Heart Disease Father         chf     Diabetes Father      Coronary Artery Disease Father      Hypertension Father      Cerebrovascular Disease Father      Other Cancer Brother          Current Outpatient Medications   Medication Sig Dispense Refill     amLODIPine (NORVASC) 10 MG tablet Take 1 tablet (10 mg) by mouth daily 90 tablet 1     doxycycline hyclate (VIBRAMYCIN) 100 MG capsule Take 1 capsule (100 mg) by mouth 2 times daily for 10 days 20 capsule 1     Allergies   Allergen Reactions     Nkda [No Known Drug Allergies]        Review of Systems   Constitutional: Negative for chills and fever.   HENT: Positive for congestion and hearing loss. Negative for ear pain and sore throat.    Eyes: Negative for pain and visual disturbance.   Respiratory: Negative for cough and shortness of breath.    Cardiovascular: Negative for chest pain, palpitations and peripheral edema.   Gastrointestinal: Negative for abdominal pain, constipation, diarrhea, heartburn, hematochezia and nausea.   Genitourinary: Negative for dysuria, frequency, genital sores, hematuria, impotence, penile discharge and urgency.   Musculoskeletal: Positive for arthralgias. Negative for joint swelling and myalgias.   Skin: Negative for rash.   Neurological: Negative for dizziness, weakness, headaches and paresthesias.    Psychiatric/Behavioral: Negative for mood changes. The patient is not nervous/anxious.        OBJECTIVE:   BP (!) 147/86   Pulse 66   Temp 97.6  F (36.4  C) (Tympanic)   Resp 14   Ht 1.829 m (6')   Wt 98 kg (216 lb)   SpO2 95%   BMI 29.29 kg/m   Estimated body mass index is 29.29 kg/m  as calculated from the following:    Height as of this encounter: 1.829 m (6').    Weight as of this encounter: 98 kg (216 lb).  Physical Exam  GENERAL: healthy, alert and no distress  EYES: Eyes grossly normal to inspection, PERRL and conjunctivae and sclerae normal  HENT: ear canals and TM's normal, nose and mouth without ulcers or lesions  NECK: no adenopathy, no asymmetry, masses, or scars and thyroid normal to palpation  RESP: lungs clear to auscultation - no rales, rhonchi or wheezes  CV: regular rates and rhythm, normal S1 S2, no S3 or S4, grade 2/6 systolic murmur heard best over the right sternal border, peripheral pulses strong and no peripheral edema  ABDOMEN: soft, nontender, no hepatosplenomegaly, no masses and bowel sounds normal   (male): normal male genitalia without lesions or urethral discharge, no hernia  MS: no gross musculoskeletal defects noted, no edema  SKIN: no suspicious lesions or rashes  NEURO: Normal strength and tone, mentation intact and speech normal  PSYCH: mentation appears normal, affect normal/bright    Diagnostic Test Results:  Labs reviewed in Epic    ASSESSMENT / PLAN:       ICD-10-CM    1. Medicare annual wellness visit, subsequent  Z00.00       2. Hypertension goal BP (blood pressure) < 150/90  I10 Comprehensive metabolic panel (BMP + Alb, Alk Phos, ALT, AST, Total. Bili, TP)     amLODIPine (NORVASC) 10 MG tablet     Comprehensive metabolic panel (BMP + Alb, Alk Phos, ALT, AST, Total. Bili, TP)      3. Elevated prostate specific antigen (PSA)  R97.20 PSA tumor marker      4. Dyslipidemia  E78.5 Lipid panel reflex to direct LDL Non-fasting     Lipid panel reflex to direct LDL  Non-fasting      5. History of Lyme disease  Z86.19 doxycycline hyclate (VIBRAMYCIN) 100 MG capsule      6. Heart murmur  R01.1       2. medical conditions are stable. meds refilled.  3. con't care with urology.   4. No interested in tx.   5.  Long discussion with patient about his concerns I offered him follow-up with infectious disease versus rheumatology.  He states that the doxycycline helps his symptoms when he gets the flareups once or twice a year and she is interested in continuing that treatment plan until it is working more.  It is unclear if this is lines related or not at this time.          COUNSELING:  Reviewed preventive health counseling, as reflected in patient instructions       Regular exercise       Healthy diet/nutrition       Vision screening      BMI:   Estimated body mass index is 29.29 kg/m  as calculated from the following:    Height as of this encounter: 1.829 m (6').    Weight as of this encounter: 98 kg (216 lb).   Weight management plan: Discussed healthy diet and exercise guidelines      He reports that he quit smoking about 7 years ago. His smoking use included cigarettes, cigars, and dip, chew, snus or snuff. He started smoking about 57 years ago. He has a 35.00 pack-year smoking history. He quit smokeless tobacco use about 8 years ago.      Appropriate preventive services were discussed with this patient, including applicable screening as appropriate for cardiovascular disease, diabetes, osteopenia/osteoporosis, and glaucoma.  As appropriate for age/gender, discussed screening for colorectal cancer, prostate cancer, breast cancer, and cervical cancer. Checklist reviewing preventive services available has been given to the patient.    Reviewed patients plan of care and provided an AVS. The Basic Care Plan (routine screening as documented in Health Maintenance) for Vikash meets the Care Plan requirement. This Care Plan has been established and reviewed with the Patient.          Joss  LESLIE [facility]    Identified Health Risks:

## 2023-02-09 LAB — PSA SERPL-MCNC: 5.43 UG/L (ref 0–4)

## 2023-04-23 ENCOUNTER — HEALTH MAINTENANCE LETTER (OUTPATIENT)
Age: 73
End: 2023-04-23

## 2023-07-27 ENCOUNTER — TELEPHONE (OUTPATIENT)
Dept: FAMILY MEDICINE | Facility: CLINIC | Age: 73
End: 2023-07-27
Payer: COMMERCIAL

## 2023-07-27 DIAGNOSIS — M54.42 BILATERAL LOW BACK PAIN WITH LEFT-SIDED SCIATICA, UNSPECIFIED CHRONICITY: Primary | ICD-10-CM

## 2023-08-02 NOTE — TELEPHONE ENCOUNTER
"  Order/Referral Request    Who is requesting: Patient    Orders being requested: Physical Therapy    Reason service is needed/diagnosis: Copied and pasting [employer] message: \"Same lower back issues that necessitated the MRI. Apparently the stenosis? old age (ha) contributed to pain and sciatica that was relieved with [facility] chiropractic from [facility].  Have a great weekend with your family.\"    When are orders needed by: ASAP Pt has visit scheduled for 1:30 today    Has this been discussed with Provider: Yes    Does patient have a preference on a Group/Provider/Facility? [facility]    Does patient have an appointment scheduled?: Yes: TODAY 1:30    Where to send orders: Fax     Could we send this information to you in [employer] or would you prefer to receive a phone call?:   Patient would prefer a phone call   Okay to leave a detailed message?: Yes at Cell number on file:    Telephone Information:   Mobile [phone]   '  "

## 2024-01-18 ENCOUNTER — PATIENT OUTREACH (OUTPATIENT)
Dept: CARE COORDINATION | Facility: CLINIC | Age: 74
End: 2024-01-18
Payer: COMMERCIAL

## 2024-02-01 ENCOUNTER — PATIENT OUTREACH (OUTPATIENT)
Dept: CARE COORDINATION | Facility: CLINIC | Age: 74
End: 2024-02-01
Payer: COMMERCIAL

## 2024-04-20 ENCOUNTER — HEALTH MAINTENANCE LETTER (OUTPATIENT)
Age: 74
End: 2024-04-20

## 2024-05-14 ENCOUNTER — TELEPHONE (OUTPATIENT)
Dept: FAMILY MEDICINE | Facility: CLINIC | Age: 74
End: 2024-05-14
Payer: COMMERCIAL

## 2024-05-14 DIAGNOSIS — E78.5 DYSLIPIDEMIA: ICD-10-CM

## 2024-05-14 DIAGNOSIS — R73.01 IMPAIRED FASTING GLUCOSE: Primary | ICD-10-CM

## 2024-05-14 DIAGNOSIS — I10 HYPERTENSION GOAL BP (BLOOD PRESSURE) < 150/90: ICD-10-CM

## 2024-05-14 NOTE — TELEPHONE ENCOUNTER
Reason for call:  Other   Patient called regarding (reason for call): call back  Additional comments:     patient had to cancel his physical due to - he is asking if you     can do pre visit orders for his physical to have that part done and     he is going to reschedule physical    Phone number to reach patient:  Home number on file [phone] (home)    Best Time:  any    Can we leave a detailed message on this number?  YES    Travel screening: Not Applicable

## 2024-05-14 NOTE — TELEPHONE ENCOUNTER
Patient wanting to do pre physical labs, is there anything you would like to order?        Jordi Hays

## 2024-05-21 NOTE — TELEPHONE ENCOUNTER
Bettye is here at the clinic wanted to speak to [facility] care team. He would like to do some labs an go from there if he is needing to have a visit with [facility] or not.

## 2024-05-22 ENCOUNTER — LAB (OUTPATIENT)
Dept: LAB | Facility: CLINIC | Age: 74
End: 2024-05-22
Payer: COMMERCIAL

## 2024-05-22 DIAGNOSIS — E78.5 DYSLIPIDEMIA: ICD-10-CM

## 2024-05-22 DIAGNOSIS — I10 HYPERTENSION GOAL BP (BLOOD PRESSURE) < 150/90: ICD-10-CM

## 2024-05-22 LAB
ALBUMIN SERPL BCG-MCNC: 4.2 G/DL (ref 3.5–5.2)
ALP SERPL-CCNC: 70 U/L (ref 40–150)
ALT SERPL W P-5'-P-CCNC: 17 U/L (ref 0–70)
ANION GAP SERPL CALCULATED.3IONS-SCNC: 10 MMOL/L (ref 7–15)
AST SERPL W P-5'-P-CCNC: 24 U/L (ref 0–45)
BILIRUB SERPL-MCNC: 0.5 MG/DL
BUN SERPL-MCNC: 20.5 MG/DL (ref 8–23)
CALCIUM SERPL-MCNC: 9.1 MG/DL (ref 8.8–10.2)
CHLORIDE SERPL-SCNC: 106 MMOL/L (ref 98–107)
CHOLEST SERPL-MCNC: 249 MG/DL
CREAT SERPL-MCNC: 1.15 MG/DL (ref 0.67–1.17)
DEPRECATED HCO3 PLAS-SCNC: 25 MMOL/L (ref 22–29)
EGFRCR SERPLBLD CKD-EPI 2021: 66 ML/MIN/1.73M2
FASTING STATUS PATIENT QL REPORTED: YES
FASTING STATUS PATIENT QL REPORTED: YES
GLUCOSE SERPL-MCNC: 98 MG/DL (ref 70–99)
HDLC SERPL-MCNC: 51 MG/DL
LDLC SERPL CALC-MCNC: 167 MG/DL
NONHDLC SERPL-MCNC: 198 MG/DL
POTASSIUM SERPL-SCNC: 4.6 MMOL/L (ref 3.4–5.3)
PROT SERPL-MCNC: 6.7 G/DL (ref 6.4–8.3)
SODIUM SERPL-SCNC: 141 MMOL/L (ref 135–145)
TRIGL SERPL-MCNC: 155 MG/DL

## 2024-05-22 PROCEDURE — 36415 COLL VENOUS BLD VENIPUNCTURE: CPT

## 2024-05-22 PROCEDURE — 80061 LIPID PANEL: CPT

## 2024-05-22 PROCEDURE — 80053 COMPREHEN METABOLIC PANEL: CPT

## 2024-05-22 NOTE — LETTER
May 23, 2024      Vikash Taylor  [address] [facility] [[email or web address]] [id number]        Dear ,    We are writing to inform you of your test results.    Please make an appointment with your provider to review or follow up on your test results.  Appointments can be made by calling [phone].    Resulted Orders   Comprehensive metabolic panel (BMP + Alb, Alk Phos, ALT, AST, Total. Bili, TP)   Result Value Ref Range    Sodium 141 135 - 145 mmol/L      Comment:      Reference intervals for this test were updated on 09/26/2023 to more accurately reflect our healthy population. There may be differences in the flagging of prior results with similar values performed with this method. Interpretation of those prior results can be made in the context of the updated reference intervals.     Potassium 4.6 3.4 - 5.3 mmol/L    Carbon Dioxide (CO2) 25 22 - 29 mmol/L    Anion Gap 10 7 - 15 mmol/L    Urea Nitrogen 20.5 8.0 - 23.0 mg/dL    Creatinine 1.15 0.67 - 1.17 mg/dL    GFR Estimate 66 >60 mL/min/1.73m2    Calcium 9.1 8.8 - 10.2 mg/dL    Chloride 106 98 - 107 mmol/L    Glucose 98 70 - 99 mg/dL    Alkaline Phosphatase 70 40 - 150 U/L    AST 24 0 - 45 U/L      Comment:      Reference intervals for this test were updated on 6/12/2023 to more accurately reflect our healthy population. There may be differences in the flagging of prior results with similar values performed with this method. Interpretation of those prior results can be made in the context of the updated reference intervals.    ALT 17 0 - 70 U/L      Comment:      Reference intervals for this test were updated on 6/12/2023 to more accurately reflect our healthy population. There may be differences in the flagging of prior results with similar values performed with this method. Interpretation of those prior results can be made in the context of the updated reference intervals.      Protein Total 6.7 6.4 - 8.3 g/dL    Albumin 4.2 3.5 - 5.2 g/dL    Bilirubin  Total 0.5 <=1.2 mg/dL    Patient Fasting > 8hrs? Yes    Lipid panel reflex to direct LDL Fasting   Result Value Ref Range    Cholesterol 249 (H) <200 mg/dL    Triglycerides 155 (H) <150 mg/dL    Direct Measure HDL 51 >=40 mg/dL    LDL Cholesterol Calculated 167 (H) <=100 mg/dL    Non HDL Cholesterol 198 (H) <130 mg/dL    Patient Fasting > 8hrs? Yes     Narrative    Cholesterol  Desirable:  <200 mg/dL    Triglycerides  Normal:  Less than 150 mg/dL  Borderline High:  150-199 mg/dL  High:  200-499 mg/dL  Very High:  Greater than or equal to 500 mg/dL    Direct Measure HDL  Female:  Greater than or equal to 50 mg/dL   Male:  Greater than or equal to 40 mg/dL    LDL Cholesterol  Desirable:  <100mg/dL  Above Desirable:  100-129 mg/dL   Borderline High:  130-159 mg/dL   High:  160-189 mg/dL   Very High:  >= 190 mg/dL    Non HDL Cholesterol  Desirable:  130 mg/dL  Above Desirable:  130-159 mg/dL  Borderline High:  160-189 mg/dL  High:  190-219 mg/dL  Very High:  Greater than or equal to 220 mg/dL       If you have any questions or concerns, please call the clinic at the number listed above.       Sincerely,      Joss Neumann PA-C

## 2024-06-09 ENCOUNTER — TRANSFERRED RECORDS (OUTPATIENT)
Dept: HEALTH INFORMATION MANAGEMENT | Facility: CLINIC | Age: 74
End: 2024-06-09
Payer: COMMERCIAL

## 2024-06-17 ENCOUNTER — TRANSFERRED RECORDS (OUTPATIENT)
Dept: HEALTH INFORMATION MANAGEMENT | Facility: CLINIC | Age: 74
End: 2024-06-17
Payer: COMMERCIAL

## 2024-06-17 PROBLEM — Z71.89 ADVANCED DIRECTIVES, COUNSELING/DISCUSSION: Status: RESOLVED | Noted: 2018-06-08 | Resolved: 2024-06-17

## 2024-07-15 ENCOUNTER — TRANSFERRED RECORDS (OUTPATIENT)
Dept: HEALTH INFORMATION MANAGEMENT | Facility: CLINIC | Age: 74
End: 2024-07-15
Payer: COMMERCIAL

## 2024-08-06 ENCOUNTER — PATIENT OUTREACH (OUTPATIENT)
Dept: CARE COORDINATION | Facility: CLINIC | Age: 74
End: 2024-08-06
Payer: COMMERCIAL

## 2024-09-17 ENCOUNTER — TELEPHONE (OUTPATIENT)
Dept: FAMILY MEDICINE | Facility: CLINIC | Age: 74
End: 2024-09-17
Payer: COMMERCIAL

## 2024-09-17 NOTE — TELEPHONE ENCOUNTER
Patient Quality Outreach    Patient is due for the following:   Physical Annual Wellness Visit    Next Steps:   Schedule a Annual Wellness Visit    Type of outreach:    Sent [employer] message.    Next Steps:  Reach out within 90 days via [employer].    Max number of attempts reached: No. Will try again in 90 days if patient still on fail list.    Questions for provider review:    None           Awa Benton MA

## 2024-10-14 ENCOUNTER — E-VISIT (OUTPATIENT)
Dept: FAMILY MEDICINE | Facility: CLINIC | Age: 74
End: 2024-10-14
Payer: COMMERCIAL

## 2024-10-14 DIAGNOSIS — R05.9 COUGH, UNSPECIFIED TYPE: Primary | ICD-10-CM

## 2024-10-14 PROCEDURE — 99421 OL DIG E/M SVC 5-10 MIN: CPT | Performed by: PHYSICIAN ASSISTANT

## 2024-10-15 RX ORDER — AZITHROMYCIN 250 MG/1
TABLET, FILM COATED ORAL
Qty: 6 TABLET | Refills: 0 | Status: SHIPPED | OUTPATIENT
Start: 2024-10-15 | End: 2024-10-20

## 2024-10-15 RX ORDER — BENZONATATE 200 MG/1
200 CAPSULE ORAL 3 TIMES DAILY PRN
Qty: 30 CAPSULE | Refills: 0 | Status: SHIPPED | OUTPATIENT
Start: 2024-10-15 | End: 2024-10-25

## 2024-12-27 ENCOUNTER — MYC MEDICAL ADVICE (OUTPATIENT)
Dept: FAMILY MEDICINE | Facility: CLINIC | Age: 74
End: 2024-12-27
Payer: COMMERCIAL

## 2025-02-14 SDOH — HEALTH STABILITY: PHYSICAL HEALTH: ON AVERAGE, HOW MANY MINUTES DO YOU ENGAGE IN EXERCISE AT THIS LEVEL?: 50 MIN

## 2025-02-14 SDOH — HEALTH STABILITY: PHYSICAL HEALTH: ON AVERAGE, HOW MANY DAYS PER WEEK DO YOU ENGAGE IN MODERATE TO STRENUOUS EXERCISE (LIKE A BRISK WALK)?: 5 DAYS

## 2025-02-14 ASSESSMENT — SOCIAL DETERMINANTS OF HEALTH (SDOH): HOW OFTEN DO YOU GET TOGETHER WITH FRIENDS OR RELATIVES?: THREE TIMES A WEEK

## 2025-02-19 ENCOUNTER — OFFICE VISIT (OUTPATIENT)
Dept: FAMILY MEDICINE | Facility: CLINIC | Age: 75
End: 2025-02-19
Payer: COMMERCIAL

## 2025-02-19 VITALS
WEIGHT: 217 LBS | HEART RATE: 71 BPM | TEMPERATURE: 97.8 F | RESPIRATION RATE: 16 BRPM | OXYGEN SATURATION: 99 % | SYSTOLIC BLOOD PRESSURE: 170 MMHG | HEIGHT: 72 IN | DIASTOLIC BLOOD PRESSURE: 90 MMHG | BODY MASS INDEX: 29.39 KG/M2

## 2025-02-19 DIAGNOSIS — Z00.00 ENCOUNTER FOR MEDICARE ANNUAL WELLNESS EXAM: Primary | ICD-10-CM

## 2025-02-19 DIAGNOSIS — I35.1 MILD AORTIC REGURGITATION: ICD-10-CM

## 2025-02-19 DIAGNOSIS — E78.5 DYSLIPIDEMIA: ICD-10-CM

## 2025-02-19 DIAGNOSIS — Z13.1 SCREENING FOR DIABETES MELLITUS: ICD-10-CM

## 2025-02-19 DIAGNOSIS — I10 HYPERTENSION GOAL BP (BLOOD PRESSURE) < 150/90: ICD-10-CM

## 2025-02-19 DIAGNOSIS — Z12.5 SCREENING PSA (PROSTATE SPECIFIC ANTIGEN): ICD-10-CM

## 2025-02-19 LAB
ALBUMIN SERPL BCG-MCNC: 4 G/DL (ref 3.5–5.2)
ALP SERPL-CCNC: 63 U/L (ref 40–150)
ALT SERPL W P-5'-P-CCNC: 16 U/L (ref 0–70)
ANION GAP SERPL CALCULATED.3IONS-SCNC: 9 MMOL/L (ref 7–15)
AST SERPL W P-5'-P-CCNC: 22 U/L (ref 0–45)
BILIRUB SERPL-MCNC: 0.6 MG/DL
BUN SERPL-MCNC: 16.5 MG/DL (ref 8–23)
CALCIUM SERPL-MCNC: 9.2 MG/DL (ref 8.8–10.4)
CHLORIDE SERPL-SCNC: 105 MMOL/L (ref 98–107)
CHOLEST SERPL-MCNC: 234 MG/DL
CREAT SERPL-MCNC: 1.1 MG/DL (ref 0.67–1.17)
EGFRCR SERPLBLD CKD-EPI 2021: 70 ML/MIN/1.73M2
FASTING STATUS PATIENT QL REPORTED: YES
FASTING STATUS PATIENT QL REPORTED: YES
GLUCOSE SERPL-MCNC: 101 MG/DL (ref 70–99)
HCO3 SERPL-SCNC: 26 MMOL/L (ref 22–29)
HDLC SERPL-MCNC: 48 MG/DL
LDLC SERPL CALC-MCNC: 147 MG/DL
NONHDLC SERPL-MCNC: 186 MG/DL
POTASSIUM SERPL-SCNC: 5.4 MMOL/L (ref 3.4–5.3)
PROT SERPL-MCNC: 6.6 G/DL (ref 6.4–8.3)
PSA SERPL DL<=0.01 NG/ML-MCNC: 4.56 NG/ML (ref 0–6.5)
SODIUM SERPL-SCNC: 140 MMOL/L (ref 135–145)
TRIGL SERPL-MCNC: 196 MG/DL

## 2025-02-19 PROCEDURE — 80061 LIPID PANEL: CPT | Performed by: PHYSICIAN ASSISTANT

## 2025-02-19 PROCEDURE — G0439 PPPS, SUBSEQ VISIT: HCPCS | Performed by: PHYSICIAN ASSISTANT

## 2025-02-19 PROCEDURE — 80053 COMPREHEN METABOLIC PANEL: CPT | Performed by: PHYSICIAN ASSISTANT

## 2025-02-19 PROCEDURE — G0103 PSA SCREENING: HCPCS | Performed by: PHYSICIAN ASSISTANT

## 2025-02-19 PROCEDURE — 99214 OFFICE O/P EST MOD 30 MIN: CPT | Mod: 25 | Performed by: PHYSICIAN ASSISTANT

## 2025-02-19 PROCEDURE — 36415 COLL VENOUS BLD VENIPUNCTURE: CPT | Performed by: PHYSICIAN ASSISTANT

## 2025-02-19 RX ORDER — LISINOPRIL 10 MG/1
10 TABLET ORAL DAILY
Qty: 30 TABLET | Refills: 0 | Status: SHIPPED | OUTPATIENT
Start: 2025-02-19

## 2025-02-19 ASSESSMENT — PAIN SCALES - GENERAL: PAINLEVEL_OUTOF10: NO PAIN (0)

## 2025-02-19 NOTE — PROGRESS NOTES
Preventive Care Visit  [facility]  Joss Neumann PA-C, Family Medicine  Feb 19, 2025      Assessment & Plan       ICD-10-CM    1. Encounter for Medicare annual wellness exam  Z00.00       2. Hypertension goal BP (blood pressure) < 150/90  I10 lisinopril (ZESTRIL) 10 MG tablet     Comprehensive metabolic panel (BMP + Alb, Alk Phos, ALT, AST, Total. Bili, TP)     Comprehensive metabolic panel (BMP + Alb, Alk Phos, ALT, AST, Total. Bili, TP)      3. Dyslipidemia  E78.5 Lipid panel reflex to direct LDL Fasting     Lipid panel reflex to direct LDL Fasting      4. Mild aortic regurgitation  I35.1     last echo 2016      5. Screening for diabetes mellitus  Z13.1 Comprehensive metabolic panel (BMP + Alb, Alk Phos, ALT, AST, Total. Bili, TP)     Comprehensive metabolic panel (BMP + Alb, Alk Phos, ALT, AST, Total. Bili, TP)      6. Screening PSA (prostate specific antigen)  Z12.5 PSA, screen     PSA, screen      Work on Healthy diet and exercise. Getting heart rate elevated for 30 mins most days of week.  2. Will get him started on lisinopriol 10mg daily. warning signs discussed. side effects discussed recheck blood pressure in 4 wks.   3. Con't lipitor.   4. Stable. Last echo 2016.       BMI  Estimated body mass index is 29.43 kg/m  as calculated from the following:    Height as of this encounter: 1.829 m (6').    Weight as of this encounter: 98.4 kg (217 lb).   Weight management plan: Discussed healthy diet and exercise guidelines    Counseling  Appropriate preventive services were addressed with this patient via screening, questionnaire, or discussion as appropriate for fall prevention, nutrition, physical activity, Tobacco-use cessation, social engagement, weight loss and cognition.  Checklist reviewing preventive services available has been given to the patient.  Reviewed patient's diet, addressing concerns and/or questions.   The patient was instructed to see the dentist every 6 months.    The patient was provided with written information regarding signs of hearing loss.       Ron Suh is a 74 year old, presenting for the following:  Physical        2/19/2025     9:47 AM   Additional Questions   Roomed by reji   Accompanied by self         2/19/2025     9:47 AM   Patient Reported Additional Medications   Patient reports taking the following new medications no           HPI      Hyperlipidemia Follow-Up    Are you regularly taking any medication or supplement to lower your cholesterol?   Yes- lipitor weekly.   Are you having muscle aches or other side effects that you think could be caused by your cholesterol lowering medication?  Yes- body aches    Hypertension Follow-up    Do you check your blood pressure regularly outside of the clinic? No   Are you following a low salt diet? Yes  Are your blood pressures ever more than 140 on the top number (systolic) OR more   than 90 on the bottom number (diastolic), for example 140/90? No  Was on amlodipine but developed leg swelling and lightheadedness and dizziness so he stopped that a few months ago.    Health Care Directive  Patient has a Health Care Directive on file  Discussed advance care planning with patient.      2/14/2025   General Health   How would you rate your overall physical health? Good   Feel stress (tense, anxious, or unable to sleep) Not at all         2/14/2025   Nutrition   Diet: Regular (no restrictions)    Breakfast skipped       Multiple values from one day are sorted in reverse-chronological order         2/14/2025   Exercise   Days per week of moderate/strenous exercise 5 days   Average minutes spent exercising at this level 50 min         2/14/2025   Social Factors   Frequency of gathering with friends or relatives Three times a week   Worry food won't last until get money to buy more No   Food not last or not have enough money for food? No   Do you have housing? (Housing is defined as stable permanent housing and does  not include staying ouside in a car, in a tent, in an abandoned building, in an overnight shelter, or couch-surfing.) Yes   Are you worried about losing your housing? No   Lack of transportation? No   Unable to get utilities (heat,electricity)? No         2/14/2025   Fall Risk   Fallen 2 or more times in the past year? No   Trouble with walking or balance? No          2/14/2025   Activities of Daily Living- Home Safety   Needs help with the following daily activites None of the above   Safety concerns in the home None of the above         2/14/2025   Dental   Dentist two times every year? (!) NO         2/14/2025   Hearing Screening   Hearing concerns? (!) I FEEL THAT PEOPLE ARE MUMBLING OR NOT SPEAKING CLEARLY.    (!) I NEED TO ASK PEOPLE TO SPEAK UP OR REPEAT THEMSELVES.    (!) IT'S HARDER TO UNDERSTAND WOMEN'S VOICES THAN MEN'S VOICES.    (!) IT'S HARD TO FOLLOW A CONVERSATION IN A NOISY RESTAURANT OR CROWDED ROOM.    (!) TROUBLE UNDESTANDING A SPEAKER IN A PUBLIC MEETING OR [employer] SERVICE.    (!) TROUBLE FOLLOWING DIALOGUE IN THE THEATHER.    (!) TROUBLE UNDERSTANDING SOFT OR WHISPERED SPEECH.    (!) TROUBLE UNDERSTANDING SPEECH ON THE TELEPHONE       Multiple values from one day are sorted in reverse-chronological order         2/14/2025   Driving Risk Screening   Patient/family members have concerns about driving No         2/14/2025   General Alertness/Fatigue Screening   Have you been more tired than usual lately? No         2/14/2025   Urinary Incontinence Screening   Bothered by leaking urine in past 6 months No            Today's PHQ-2 Score:       2/19/2025     9:39 AM   PHQ-2 ( 1999 [employer])   Q1: Little interest or pleasure in doing things 0   Q2: Feeling down, depressed or hopeless 0   PHQ-2 Score 0    Q1: Little interest or pleasure in doing things Not at all   Q2: Feeling down, depressed or hopeless Not at all   PHQ-2 Score 0       Patient-reported           2/14/2025   Substance Use   Alcohol  more than 3/day or more than 7/wk No   Do you have a current opioid prescription? No   How severe/bad is pain from 1 to 10? 1/10   Do you use any other substances recreationally? No     Social History     Tobacco Use    Smoking status: Former     Current packs/day: 0.00     Average packs/day: 1 pack/day for 49.9 years (49.9 ttl pk-yrs)     Types: Cigarettes, Cigars, Dip, chew, snus or snuff     Start date: 6/15/1965     Quit date: 2015     Years since quittin.8    Smokeless tobacco: Former     Quit date: 2015   Substance Use Topics    Alcohol use: Yes     Alcohol/week: 0.0 standard drinks of alcohol     Comment: occ    Drug use: No       ASCVD Risk   The 10-year ASCVD risk score (Jose De Jesus JOE, et al., 2019) is: 46.9%    Values used to calculate the score:      Age: 74 years      Sex: Male      Is Non- : No      Diabetic: No      Tobacco smoker: No      Systolic Blood Pressure: 170 mmHg      Is BP treated: Yes      HDL Cholesterol: 51 mg/dL      Total Cholesterol: 249 mg/dL          Reviewed and updated as needed this visit by Provider   Tobacco  Allergies  Meds  Problems  Med Hx  Surg Hx  Fam Hx            Past Medical History:   Diagnosis Date    Calculi, renal     Essential hypertension, benign 2016    Heart murmur 2016    Hypertension goal BP (blood pressure) < 150/90 5/3/2017    Hypogonadism male 2010    Lyme disease 2018    Shingles     Testicular atrophy      Past Surgical History:   Procedure Laterality Date    ARTHROSCOPY KNEE RT/LT      ACL    COLONOSCOPY  2013    Procedure: COLONOSCOPY;  COLONSCOPY-SCREENING;  Surgeon: Vikash Brown MD;  Location: [facility] OR    LITHOTRIPSY      Lithotrypsy    SURGICAL HISTORY OF -       toe fusion    TONSILLECTOMY       Lab work is in process  Labs reviewed in EPIC  BP Readings from Last 3 Encounters:   25 (!) 170/90   23 (!) 147/86   22 138/70    Wt Readings from Last 3  Encounters:   25 98.4 kg (217 lb)   23 98 kg (216 lb)   22 102.1 kg (225 lb)                  Patient Active Problem List   Diagnosis    Hypertriglyceridemia    Dyslipidemia    HYPERLIPIDEMIA LDL GOAL <160    Shingles    Obesity    Wears hearing aid    Essential hypertension, benign    Heart murmur    SOB (shortness of breath)    Bilateral edema of lower extremity    Hypertension goal BP (blood pressure) < 150/90    Retinal detachment, unspecified laterality    Bilateral low back pain with left-sided sciatica, unspecified chronicity    Impaired fasting glucose    Cataract of right eye, unspecified cataract type    Lyme disease    History of Lyme disease    Mild aortic regurgitation     Past Surgical History:   Procedure Laterality Date    ARTHROSCOPY KNEE RT/LT      ACL    COLONOSCOPY  2013    Procedure: COLONOSCOPY;  COLONSCOPY-SCREENING;  Surgeon: Vikash Brown MD;  Location: [facility] OR    LITHOTRIPSY      Lithotrypsy    SURGICAL HISTORY OF -       toe fusion    TONSILLECTOMY         Social History     Tobacco Use    Smoking status: Former     Current packs/day: 0.00     Average packs/day: 1 pack/day for 49.9 years (49.9 ttl pk-yrs)     Types: Cigarettes, Cigars, Dip, chew, snus or snuff     Start date: 6/15/1965     Quit date: 2015     Years since quittin.8    Smokeless tobacco: Former     Quit date: 2015   Substance Use Topics    Alcohol use: Yes     Alcohol/week: 0.0 standard drinks of alcohol     Comment: occ     Family History   Problem Relation Age of Onset    Diabetes Mother     Hypertension Mother     Cerebrovascular Disease Mother     Heart Disease Father         chf    Diabetes Father     Coronary Artery Disease Father     Hypertension Father     Cerebrovascular Disease Father     Other Cancer Brother          Current Outpatient Medications   Medication Sig Dispense Refill    lisinopril (ZESTRIL) 10 MG tablet Take 1 tablet (10 mg) by mouth daily. 30 tablet 0     amLODIPine (NORVASC) 10 MG tablet Take 1 tablet (10 mg) by mouth daily (Patient not taking: Reported on 2/19/2025) 90 tablet 1     Allergies   Allergen Reactions    Nkda [No Known Drug Allergy]      Recent Labs   Lab Test 05/22/24  0817 02/08/23  1457 01/04/22  1016 03/17/21  0000 03/12/19  0734 11/08/18  1321   * 150* 150*  --  169*  --    HDL 51 55 48  --  52  --    TRIG 155* 164* 226*  --  156*  --    ALT 17 24  --   --  33 28   CR 1.15 1.18 1.10   < > 1.09 1.15   GFRESTIMATED 66 65 71   < > 68 63   GFRESTBLACK  --   --   --   --  79 76   POTASSIUM 4.6 4.1 4.9   < > 4.6 4.9   TSH  --   --   --   --  0.44 0.14*    < > = values in this interval not displayed.      Current providers sharing in care for this patient include:  Patient Care Team:  Joss Neumann PA-C as PCP - General (Family Practice)  Joss Neumann PA-C as Assigned PCP    The following health maintenance items are reviewed in Epic and correct as of today:  Health Maintenance   Topic Date Due    ANNUAL REVIEW OF  ORDERS  Never done    LUNG CANCER SCREENING  Never done    RSV VACCINE (1 - 1-dose 75+ series) Never done    INFLUENZA VACCINE (1) 09/01/2024    COVID-19 Vaccine (3 - 2024-25 season) 09/01/2024    BMP  05/22/2025    LIPID  05/22/2025    MEDICARE ANNUAL WELLNESS VISIT  02/19/2026    FALL RISK ASSESSMENT  02/19/2026    COLORECTAL CANCER SCREENING  01/05/2027    GLUCOSE  05/22/2027    ADVANCE CARE PLANNING  02/19/2030    DTAP/TDAP/TD IMMUNIZATION (3 - Td or Tdap) 05/02/2032    HEPATITIS C SCREENING  Completed    PHQ-2 (once per calendar year)  Completed    Pneumococcal Vaccine: 50+ Years  Completed    ZOSTER IMMUNIZATION  Completed    HPV IMMUNIZATION  Aged Out    MENINGITIS IMMUNIZATION  Aged Out         Review of Systems  Constitutional, HEENT, cardiovascular, pulmonary, gi and gu systems are negative, except as otherwise noted.     Objective    Exam  BP (!) 170/90   Pulse 71   Temp 97.8  F (36.6  C) (Tympanic)    Resp 16   Ht 1.829 m (6')   Wt 98.4 kg (217 lb)   SpO2 99%   BMI 29.43 kg/m     Estimated body mass index is 29.43 kg/m  as calculated from the following:    Height as of this encounter: 1.829 m (6').    Weight as of this encounter: 98.4 kg (217 lb).    Physical Exam  GENERAL: alert and no distress  EYES: Eyes grossly normal to inspection, PERRL and conjunctivae and sclerae normal  HENT: ear canals and TM's normal, nose and mouth without ulcers or lesions  NECK: no adenopathy, no asymmetry, masses, or scars  RESP: lungs clear to auscultation - no rales, rhonchi or wheezes  CV: regular rate and rhythm, normal S1 S2, no S3 or S4,  murmur, click or rub, no peripheral edema  ABDOMEN: soft, nontender, no hepatosplenomegaly, no masses and bowel sounds normal  MS: no gross musculoskeletal defects noted, no edema  SKIN: no suspicious lesions or rashes  NEURO: Normal strength and tone, mentation intact and speech normal  PSYCH: mentation appears normal, affect normal/bright        2/19/2025   Mini Cog   Mini-Cog Not Completed (choose reason) Patient declines       Patient declines, there are NO concerns for cognitive deficits.           Signed Electronically by: Joss Neumann PA-C

## 2025-02-19 NOTE — PATIENT INSTRUCTIONS
Patient Education   Preventive Care Advice   This is general advice given by our system to help you stay healthy. However, your care team may have specific advice just for you. Please talk to your care team about your preventive care needs.  Nutrition  Eat 5 or more servings of fruits and vegetables each day.  Try wheat bread, brown rice and whole grain pasta (instead of white bread, rice, and pasta).  Get enough calcium and vitamin D. Check the label on foods and aim for 100% of the RDA (recommended daily allowance).  Lifestyle  Exercise at least 150 minutes each week  (30 minutes a day, 5 days a week).  Do muscle strengthening activities 2 days a week. These help control your weight and prevent disease.  No smoking.  Wear sunscreen to prevent skin cancer.  Have a dental exam and cleaning every 6 months.  Yearly exams  See your health care team every year to talk about:  Any changes in your health.  Any medicines your care team has prescribed.  Preventive care, family planning, and ways to prevent chronic diseases.  Shots (vaccines)   HPV shots (up to age 26), if you've never had them before.  Hepatitis B shots (up to age 59), if you've never had them before.  COVID-19 shot: Get this shot when it's due.  Flu shot: Get a flu shot every year.  Tetanus shot: Get a tetanus shot every 10 years.  Pneumococcal, hepatitis A, and RSV shots: Ask your care team if you need these based on your risk.  Shingles shot (for age 50 and up)  General health tests  Diabetes screening:  Starting at age 35, Get screened for diabetes at least every 3 years.  If you are younger than age 35, ask your care team if you should be screened for diabetes.  Cholesterol test: At age 39, start having a cholesterol test every 5 years, or more often if advised.  Bone density scan (DEXA): At age 50, ask your care team if you should have this scan for osteoporosis (brittle bones).  Hepatitis C: Get tested at least once in your life.  STIs (sexually  transmitted infections)  Before age 24: Ask your care team if you should be screened for STIs.  After age 24: Get screened for STIs if you're at risk. You are at risk for STIs (including HIV) if:  You are sexually active with more than one person.  You don't use condoms every time.  You or a partner was diagnosed with a sexually transmitted infection.  If you are at risk for HIV, ask about PrEP medicine to prevent HIV.  Get tested for HIV at least once in your life, whether you are at risk for HIV or not.  Cancer screening tests  Cervical cancer screening: If you have a cervix, begin getting regular cervical cancer screening tests starting at age 21.  Breast cancer scan (mammogram): If you've ever had breasts, begin having regular mammograms starting at age 40. This is a scan to check for breast cancer.  Colon cancer screening: It is important to start screening for colon cancer at age 45.  Have a colonoscopy test every 10 years (or more often if you're at risk) Or, ask your provider about stool tests like a FIT test every year or Cologuard test every 3 years.  To learn more about your testing options, visit:   .  For help making a decision, visit:   https://bit.ly/wz54980.  Prostate cancer screening test: If you have a prostate, ask your care team if a prostate cancer screening test (PSA) at age 55 is right for you.  Lung cancer screening: If you are a current or former smoker ages 50 to 80, ask your care team if ongoing lung cancer screenings are right for you.  For informational purposes only. Not to replace the advice of your health care provider. Copyright   2023 [facility] [employer]. All rights reserved. Clinically reviewed by the [facility]. [employer] [id number] - REV 01/24.  Hearing Loss: Care Instructions  Overview     Hearing loss is a sudden or slow decrease in how well you hear. It can range from slight to profound. Permanent hearing loss can occur with aging. It also can  happen when you are exposed long-term to loud noise. Examples include listening to loud music, riding motorcycles, or being around other loud machines.  Hearing loss can affect your work and home life. It can make you feel lonely or depressed. You may feel that you have lost your independence. But hearing aids and other devices can help you hear better and feel connected to others.  Follow-up care is a key part of your treatment and safety. Be sure to make and go to all appointments, and call your doctor if you are having problems. It's also a good idea to know your test results and keep a list of the medicines you take.  How can you care for yourself at home?  Avoid loud noises whenever possible. This helps keep your hearing from getting worse.  Always wear hearing protection around loud noises.  Wear a hearing aid as directed.  A professional can help you pick a hearing aid that will work best for you.  You can also get hearing aids over the counter for mild to moderate hearing loss.  Have hearing tests as your doctor suggests. They can show whether your hearing has changed. Your hearing aid may need to be adjusted.  Use other devices as needed. These may include:  Telephone amplifiers and hearing aids that can connect to a television, stereo, radio, or microphone.  Devices that use lights or vibrations. These alert you to the doorbell, a ringing telephone, or a baby monitor.  Television closed-captioning. This shows the words at the bottom of the screen. Most new TVs can do this.  TTY (text telephone). This lets you type messages back and forth on the telephone instead of talking or listening. These devices are also called TDD. When messages are typed on the keyboard, they are sent over the phone line to a receiving TTY. The message is shown on a monitor.  Use text messaging, social media, and email if it is hard for you to communicate by telephone.  Try to learn a listening technique called speechreading. It is  "not lipreading. You pay attention to people's gestures, expressions, posture, and tone of voice. These clues can help you understand what a person is saying. Face the person you are talking to, and have them face you. Make sure the lighting is good. You need to see the other person's face clearly.  Think about counseling if you need help to adjust to your hearing loss.  When should you call for help?  Watch closely for changes in your health, and be sure to contact your doctor if:    You think your hearing is getting worse.     You have new symptoms, such as dizziness or nausea.   Where can you learn more?  Go to https://www.[employer]ain[employer].net/patiented  Enter R798 in the search box to learn more about \"Hearing Loss: Care Instructions.\"  Current as of: September 27, 2023  Content Version: 14.3    2024 [employer].   Care instructions adapted under license by your healthcare professional. If you have questions about a medical condition or this instruction, always ask your healthcare professional. [employer] disclaims any warranty or liability for your use of this information.       "

## 2025-03-18 ENCOUNTER — OFFICE VISIT (OUTPATIENT)
Dept: FAMILY MEDICINE | Facility: CLINIC | Age: 75
End: 2025-03-18
Payer: COMMERCIAL

## 2025-03-18 VITALS
HEART RATE: 68 BPM | DIASTOLIC BLOOD PRESSURE: 100 MMHG | BODY MASS INDEX: 29.26 KG/M2 | RESPIRATION RATE: 16 BRPM | TEMPERATURE: 97.5 F | WEIGHT: 216 LBS | SYSTOLIC BLOOD PRESSURE: 170 MMHG | HEIGHT: 72 IN | OXYGEN SATURATION: 97 %

## 2025-03-18 DIAGNOSIS — J40 BRONCHITIS: Primary | ICD-10-CM

## 2025-03-18 DIAGNOSIS — I10 HYPERTENSION GOAL BP (BLOOD PRESSURE) < 150/90: ICD-10-CM

## 2025-03-18 PROCEDURE — 3080F DIAST BP >= 90 MM HG: CPT | Performed by: PHYSICIAN ASSISTANT

## 2025-03-18 PROCEDURE — G2211 COMPLEX E/M VISIT ADD ON: HCPCS | Performed by: PHYSICIAN ASSISTANT

## 2025-03-18 PROCEDURE — 99214 OFFICE O/P EST MOD 30 MIN: CPT | Performed by: PHYSICIAN ASSISTANT

## 2025-03-18 PROCEDURE — 1126F AMNT PAIN NOTED NONE PRSNT: CPT | Performed by: PHYSICIAN ASSISTANT

## 2025-03-18 PROCEDURE — 3077F SYST BP >= 140 MM HG: CPT | Performed by: PHYSICIAN ASSISTANT

## 2025-03-18 RX ORDER — LISINOPRIL AND HYDROCHLOROTHIAZIDE 12.5; 2 MG/1; MG/1
1 TABLET ORAL DAILY
Qty: 30 TABLET | Refills: 0 | Status: SHIPPED | OUTPATIENT
Start: 2025-03-18

## 2025-03-18 RX ORDER — AZITHROMYCIN 250 MG/1
TABLET, FILM COATED ORAL
Qty: 6 TABLET | Refills: 0 | Status: SHIPPED | OUTPATIENT
Start: 2025-03-18 | End: 2025-03-23

## 2025-03-18 ASSESSMENT — PAIN SCALES - GENERAL: PAINLEVEL_OUTOF10: NO PAIN (0)

## 2025-03-18 NOTE — PROGRESS NOTES
Assessment & Plan       ICD-10-CM    1. Bronchitis  J40 azithromycin (ZITHROMAX) 250 MG tablet      2. Hypertension goal BP (blood pressure) < 150/90  I10 lisinopril-hydrochlorothiazide (ZESTORETIC) 20-12.5 MG tablet        Warning signs discussed. Side effects discussed. Symptomatic treatment such as pushing fluids. Ok for over the counter acetaminophen and /or non-steroidal anti-inflammatory medication as needed.   Increase meds to Lisinopril/ hydrochlorothiazide: 20/12.5mg. recheck 1 wk. warning signs discussed. side effects discussed   The longitudinal plan of care for the diagnosis(es)/condition(s) as documented were addressed during this visit. Due to the added complexity in care, I will continue to support Vikash in the subsequent management and with ongoing continuity of care.    Subjective   Vikash is a 74 year old, presenting for the following health issues:  Hypertension        3/18/2025     9:44 AM   Additional Questions   Roomed by reji   Accompanied by self         3/18/2025     9:44 AM   Patient Reported Additional Medications   Patient reports taking the following new medications no     History of Present Illness       Hypertension: He presents for follow up of hypertension.  He does not check blood pressure  regularly outside of the clinic. Outside blood pressures have been over 140/90. He does not follow a low salt diet.     He eats 2-3 servings of fruits and vegetables daily.He consumes 0 sweetened beverage(s) daily.He exercises with enough effort to increase his heart rate 30 to 60 minutes per day.  He exercises with enough effort to increase his heart rate 4 days per week.   He is taking medications regularly.    Here for blood pressure check after switch to lisinopril as amlodipine caused leg edema.     No chest pain or short of breath. No headache or dizziness.     Over the last 10 days he has noticed sinus pressure and congestion along with cough and wheezing.  He denies any fevers chills or  "bodyaches.        Review of Systems  Constitutional, HEENT, cardiovascular, pulmonary, gi and gu systems are negative, except as otherwise noted.      Objective    BP (!) 170/100   Pulse 68   Temp 97.5  F (36.4  C) (Tympanic)   Resp 16   Ht 1.816 m (5' 11.5\")   Wt 98 kg (216 lb)   SpO2 97%   BMI 29.71 kg/m    Body mass index is 29.71 kg/m .  Physical Exam   GENERAL: healthy, alert and no distress  Head: Normocephalic, atraumatic.  Eyes: Conjunctiva clear, non icteric. PERRLA.  Ears: External ears and TMs normal BL.  Nose: Septum midline, nasal mucosa pink and moist. No discharge.  Mouth / Throat: Normal dentition.  No oral lesions. Pharynx non erythematous, tonsils without hypertrophy.  Neck: Supple, no enlarged LN, trachea midline.   RESP: diffuse rhonchi and wheezes   CV: regular rate and rhythm, normal S1 S2, no S3 or S4, no murmur, click or rub, no peripheral edema and peripheral pulses strong  MS: no gross musculoskeletal defects noted, no edema          Signed Electronically by: Joss Neumann PA-C    "

## 2025-04-02 ENCOUNTER — OFFICE VISIT (OUTPATIENT)
Dept: FAMILY MEDICINE | Facility: CLINIC | Age: 75
End: 2025-04-02
Payer: COMMERCIAL

## 2025-04-02 VITALS
HEART RATE: 80 BPM | OXYGEN SATURATION: 96 % | DIASTOLIC BLOOD PRESSURE: 82 MMHG | SYSTOLIC BLOOD PRESSURE: 135 MMHG | TEMPERATURE: 98.2 F | RESPIRATION RATE: 16 BRPM | WEIGHT: 214 LBS | BODY MASS INDEX: 29.43 KG/M2

## 2025-04-02 DIAGNOSIS — I10 HYPERTENSION GOAL BP (BLOOD PRESSURE) < 150/90: Primary | ICD-10-CM

## 2025-04-02 DIAGNOSIS — E78.5 DYSLIPIDEMIA: ICD-10-CM

## 2025-04-02 LAB
ANION GAP SERPL CALCULATED.3IONS-SCNC: 9 MMOL/L (ref 7–15)
BUN SERPL-MCNC: 25.1 MG/DL (ref 8–23)
CALCIUM SERPL-MCNC: 9.4 MG/DL (ref 8.8–10.4)
CHLORIDE SERPL-SCNC: 102 MMOL/L (ref 98–107)
CREAT SERPL-MCNC: 1.21 MG/DL (ref 0.67–1.17)
EGFRCR SERPLBLD CKD-EPI 2021: 62 ML/MIN/1.73M2
GLUCOSE SERPL-MCNC: 136 MG/DL (ref 70–99)
HCO3 SERPL-SCNC: 27 MMOL/L (ref 22–29)
POTASSIUM SERPL-SCNC: 4.2 MMOL/L (ref 3.4–5.3)
SODIUM SERPL-SCNC: 138 MMOL/L (ref 135–145)

## 2025-04-02 PROCEDURE — 1126F AMNT PAIN NOTED NONE PRSNT: CPT | Performed by: PHYSICIAN ASSISTANT

## 2025-04-02 PROCEDURE — 3079F DIAST BP 80-89 MM HG: CPT | Performed by: PHYSICIAN ASSISTANT

## 2025-04-02 PROCEDURE — G2211 COMPLEX E/M VISIT ADD ON: HCPCS | Performed by: PHYSICIAN ASSISTANT

## 2025-04-02 PROCEDURE — 99213 OFFICE O/P EST LOW 20 MIN: CPT | Performed by: PHYSICIAN ASSISTANT

## 2025-04-02 PROCEDURE — 36415 COLL VENOUS BLD VENIPUNCTURE: CPT | Performed by: PHYSICIAN ASSISTANT

## 2025-04-02 PROCEDURE — 3075F SYST BP GE 130 - 139MM HG: CPT | Performed by: PHYSICIAN ASSISTANT

## 2025-04-02 PROCEDURE — 80048 BASIC METABOLIC PNL TOTAL CA: CPT | Performed by: PHYSICIAN ASSISTANT

## 2025-04-02 RX ORDER — LISINOPRIL AND HYDROCHLOROTHIAZIDE 12.5; 2 MG/1; MG/1
1 TABLET ORAL DAILY
Qty: 90 TABLET | Refills: 3 | Status: SHIPPED | OUTPATIENT
Start: 2025-04-02

## 2025-04-02 RX ORDER — EZETIMIBE 10 MG/1
10 TABLET ORAL DAILY
Qty: 90 TABLET | Refills: 3 | Status: CANCELLED | OUTPATIENT
Start: 2025-04-02

## 2025-04-02 ASSESSMENT — PAIN SCALES - GENERAL: PAINLEVEL_OUTOF10: NO PAIN (0)

## 2025-04-02 NOTE — PROGRESS NOTES
Assessment & Plan       ICD-10-CM    1. Hypertension goal BP (blood pressure) < 150/90  I10 Basic metabolic panel  (Ca, Cl, CO2, Creat, Gluc, K, Na, BUN)     lisinopril-hydrochlorothiazide (ZESTORETIC) 20-12.5 MG tablet     Basic metabolic panel  (Ca, Cl, CO2, Creat, Gluc, K, Na, BUN)      2. Dyslipidemia  E78.5         Work on Healthy diet and exercise. Getting heart rate elevated for 30 mins most days of week.  medical conditions are stable. meds refilled.  2. He is going to try to take lipitor every other day. warning signs discussed.     The longitudinal plan of care for the diagnosis(es)/condition(s) as documented were addressed during this visit. Due to the added complexity in care, I will continue to support Vikash in the subsequent management and with ongoing continuity of care.    Subjective   Vikash is a 75 year old, presenting for the following health issues:  Hypertension        4/2/2025     1:09 PM   Additional Questions   Roomed by reji   Accompanied by self         4/2/2025     1:09 PM   Patient Reported Additional Medications   Patient reports taking the following new medications no     History of Present Illness       Hypertension: He presents for follow up of hypertension.  He does check blood pressure  regularly outside of the clinic. Outside blood pressures have been over 140/90. He does not follow a low salt diet.     He eats 2-3 servings of fruits and vegetables daily.He consumes 0 sweetened beverage(s) daily.He exercises with enough effort to increase his heart rate 30 to 60 minutes per day.  He exercises with enough effort to increase his heart rate 4 days per week.   He is taking medications regularly.          Hyperlipidemia Follow-Up    Are you regularly taking any medication or supplement to lower your cholesterol?   No  Are you having muscle aches or other side effects that you think could be caused by your cholesterol lowering medication?  Yes- body aches    Hypertension  Follow-up    Do you check your blood pressure regularly outside of the clinic? Yes   Are you following a low salt diet? Yes  Are your blood pressures ever more than 140 on the top number (systolic) OR more   than 90 on the bottom number (diastolic), for example 140/90? No      Review of Systems  Constitutional, HEENT, cardiovascular, pulmonary, gi and gu systems are negative, except as otherwise noted.      Objective    /82   Pulse 80   Temp 98.2  F (36.8  C) (Tympanic)   Resp 16   Wt 97.1 kg (214 lb)   SpO2 96%   BMI 29.43 kg/m    Body mass index is 29.43 kg/m .  Physical Exam   GENERAL: alert and no distress  RESP: lungs clear to auscultation - no rales, rhonchi or wheezes  CV: regular rate and rhythm, normal S1 S2, no S3 or S4, no murmur, click or rub, no peripheral edema  MS: no gross musculoskeletal defects noted, no edema    Labs pending        Signed Electronically by: Joss Neumann PA-C

## 2025-04-09 ENCOUNTER — MYC MEDICAL ADVICE (OUTPATIENT)
Dept: FAMILY MEDICINE | Facility: CLINIC | Age: 75
End: 2025-04-09
Payer: COMMERCIAL

## 2025-05-30 ENCOUNTER — ANCILLARY PROCEDURE (OUTPATIENT)
Dept: GENERAL RADIOLOGY | Facility: CLINIC | Age: 75
End: 2025-05-30
Attending: INTERNAL MEDICINE
Payer: COMMERCIAL

## 2025-05-30 ENCOUNTER — RESULTS FOLLOW-UP (OUTPATIENT)
Dept: FAMILY MEDICINE | Facility: CLINIC | Age: 75
End: 2025-05-30

## 2025-05-30 DIAGNOSIS — R05.3 CHRONIC COUGH: ICD-10-CM

## 2025-05-30 PROCEDURE — 71046 X-RAY EXAM CHEST 2 VIEWS: CPT | Mod: TC | Performed by: RADIOLOGY

## 2025-08-07 ENCOUNTER — E-VISIT (OUTPATIENT)
Dept: FAMILY MEDICINE | Facility: CLINIC | Age: 75
End: 2025-08-07
Payer: COMMERCIAL

## 2025-08-07 DIAGNOSIS — R69 DIAGNOSIS DEFERRED: Primary | ICD-10-CM
